# Patient Record
Sex: FEMALE | Race: WHITE | NOT HISPANIC OR LATINO | URBAN - METROPOLITAN AREA
[De-identification: names, ages, dates, MRNs, and addresses within clinical notes are randomized per-mention and may not be internally consistent; named-entity substitution may affect disease eponyms.]

---

## 2017-11-01 ENCOUNTER — EMERGENCY (EMERGENCY)
Facility: HOSPITAL | Age: 57
LOS: 1 days | Discharge: HOME | End: 2017-11-01
Attending: EMERGENCY MEDICINE | Admitting: EMERGENCY MEDICINE
Payer: MEDICARE

## 2017-11-01 VITALS
TEMPERATURE: 97 F | DIASTOLIC BLOOD PRESSURE: 65 MMHG | SYSTOLIC BLOOD PRESSURE: 120 MMHG | OXYGEN SATURATION: 100 % | HEART RATE: 62 BPM | RESPIRATION RATE: 18 BRPM

## 2017-11-01 VITALS
RESPIRATION RATE: 18 BRPM | OXYGEN SATURATION: 100 % | HEART RATE: 64 BPM | DIASTOLIC BLOOD PRESSURE: 63 MMHG | WEIGHT: 110.89 LBS | SYSTOLIC BLOOD PRESSURE: 98 MMHG | HEIGHT: 64 IN | TEMPERATURE: 98 F

## 2017-11-01 LAB
APTT BLD: 28.6 SEC — SIGNIFICANT CHANGE UP (ref 27.5–37.4)
CK SERPL-CCNC: 71 U/L — SIGNIFICANT CHANGE UP (ref 25–170)
HCT VFR BLD CALC: 37.4 % — SIGNIFICANT CHANGE UP (ref 37–47)
HGB BLD-MCNC: 12.7 G/DL — SIGNIFICANT CHANGE UP (ref 12–16)
INR BLD: 0.95 RATIO — SIGNIFICANT CHANGE UP (ref 0.88–1.16)
MCHC RBC-ENTMCNC: 31 PG — SIGNIFICANT CHANGE UP (ref 27–31)
MCHC RBC-ENTMCNC: 34 G/DL — SIGNIFICANT CHANGE UP (ref 32–36)
MCV RBC AUTO: 91.2 FL — SIGNIFICANT CHANGE UP (ref 81–99)
PLATELET # BLD AUTO: 276 K/UL — SIGNIFICANT CHANGE UP (ref 150–400)
PROTHROM AB SERPL-ACNC: 10.4 SEC — SIGNIFICANT CHANGE UP (ref 9.8–12.7)
RBC # BLD: 4.1 M/UL — LOW (ref 4.4–5.2)
RBC # FLD: 13.2 % — SIGNIFICANT CHANGE UP (ref 11–15.6)
T3 SERPL-MCNC: 101 NG/DL — SIGNIFICANT CHANGE UP (ref 80–200)
TROPONIN T SERPL-MCNC: <0.01 NG/ML — SIGNIFICANT CHANGE UP (ref 0–0.06)
TROPONIN T SERPL-MCNC: <0.01 NG/ML — SIGNIFICANT CHANGE UP (ref 0–0.06)
WBC # BLD: 6.6 K/UL — SIGNIFICANT CHANGE UP (ref 4.8–10.8)
WBC # FLD AUTO: 6.6 K/UL — SIGNIFICANT CHANGE UP (ref 4.8–10.8)

## 2017-11-01 PROCEDURE — 84484 ASSAY OF TROPONIN QUANT: CPT

## 2017-11-01 PROCEDURE — 85610 PROTHROMBIN TIME: CPT

## 2017-11-01 PROCEDURE — 84436 ASSAY OF TOTAL THYROXINE: CPT

## 2017-11-01 PROCEDURE — 80053 COMPREHEN METABOLIC PANEL: CPT

## 2017-11-01 PROCEDURE — 93005 ELECTROCARDIOGRAM TRACING: CPT

## 2017-11-01 PROCEDURE — 36415 COLL VENOUS BLD VENIPUNCTURE: CPT

## 2017-11-01 PROCEDURE — 82550 ASSAY OF CK (CPK): CPT

## 2017-11-01 PROCEDURE — 84443 ASSAY THYROID STIM HORMONE: CPT

## 2017-11-01 PROCEDURE — 99283 EMERGENCY DEPT VISIT LOW MDM: CPT | Mod: 25

## 2017-11-01 PROCEDURE — 84480 ASSAY TRIIODOTHYRONINE (T3): CPT

## 2017-11-01 PROCEDURE — 85730 THROMBOPLASTIN TIME PARTIAL: CPT

## 2017-11-01 PROCEDURE — 83735 ASSAY OF MAGNESIUM: CPT

## 2017-11-01 PROCEDURE — 84100 ASSAY OF PHOSPHORUS: CPT

## 2017-11-01 PROCEDURE — 99285 EMERGENCY DEPT VISIT HI MDM: CPT

## 2017-11-01 PROCEDURE — 93010 ELECTROCARDIOGRAM REPORT: CPT

## 2017-11-01 PROCEDURE — 85027 COMPLETE CBC AUTOMATED: CPT

## 2017-11-01 RX ORDER — IBUPROFEN 200 MG
600 TABLET ORAL ONCE
Qty: 0 | Refills: 0 | Status: COMPLETED | OUTPATIENT
Start: 2017-11-01 | End: 2017-11-01

## 2017-11-01 RX ORDER — POTASSIUM CHLORIDE 20 MEQ
40 PACKET (EA) ORAL ONCE
Qty: 0 | Refills: 0 | Status: COMPLETED | OUTPATIENT
Start: 2017-11-01 | End: 2017-11-01

## 2017-11-01 RX ORDER — SODIUM CHLORIDE 9 MG/ML
3 INJECTION INTRAMUSCULAR; INTRAVENOUS; SUBCUTANEOUS ONCE
Qty: 0 | Refills: 0 | Status: COMPLETED | OUTPATIENT
Start: 2017-11-01 | End: 2017-11-01

## 2017-11-01 RX ADMIN — Medication 600 MILLIGRAM(S): at 16:46

## 2017-11-01 RX ADMIN — Medication 40 MILLIEQUIVALENT(S): at 15:59

## 2017-11-01 RX ADMIN — Medication 600 MILLIGRAM(S): at 15:59

## 2017-11-01 RX ADMIN — SODIUM CHLORIDE 3 MILLILITER(S): 9 INJECTION INTRAMUSCULAR; INTRAVENOUS; SUBCUTANEOUS at 13:40

## 2017-11-01 NOTE — ED ADULT TRIAGE NOTE - CHIEF COMPLAINT QUOTE
pt reports she has been having on and off numbness to extremities for a couple weeks and right side of face, slurring to speech noted, sent in from PMD office today as she was experiencing chest heaviness.

## 2017-11-01 NOTE — ED PROVIDER NOTE - MEDICAL DECISION MAKING DETAILS
PT WITH CHESTP AIN BACK PAIN BUE NUMBNESS, FATIGUE. PERSISTENT ABNORMAL EKG WITHOUT NEW CHANGES WORK  UP ORDERED. RESULTS PENDING. DR LOMBARDI, [facility]

## 2017-11-01 NOTE — ED ADULT NURSE REASSESSMENT NOTE - NS ED NURSE REASSESS COMMENT FT1
pt aware she is waiting for second troponin to be drawn at 1700, verbalized understanding. Pt in no apparent distress, resting comfortably in bed.

## 2017-11-01 NOTE — ED PROVIDER NOTE - OBJECTIVE STATEMENT
PATIENT STATES SHE HAS HAD A "WEIGHT ON HER CHEST" TODAY. SHE ALSO FEELS UPPER BACK DISCOMFORT. SHE ALSO ADMITS TO NUMBNESS FROM THE SHOULDERS TO THE ELBOWS BILATERALLY. SHE ALSO ADMITS TO TINGLING ON THE LEFT SIDE OF HER BODY ON AND OFF FOR THE PAST 2 WEEKS PATIENT STATES SHE HAS HAD A "WEIGHT ON HER CHEST" TODAY. SHE ALSO FEELS UPPER BACK DISCOMFORT. SHE ALSO ADMITS TO NUMBNESS FROM THE SHOULDERS TO THE ELBOWS BILATERALLY. SHE ALSO ADMITS TO TINGLING ON THE LEFT SIDE OF HER BODY ON AND OFF FOR THE PAST 2 WEEKS. pT ALSO ADMITS TO FEELING "TIRED"  SHE HAS A HX OF MYASTHENIA GRAVIS. NON SMOKER. NO OTHER ASSOCIATED SYMPTOMS NEG FHX CAD  MED HXMyasthenia gravis    Trigeminal nerve disease  Left side jaw  THYROID DISEASE

## 2017-11-01 NOTE — ED ADULT NURSE NOTE - OBJECTIVE STATEMENT
Pt has had chest pain radiating to her shoulder blades for the last week on and off. Pt states she does not have shortness of breath but hurts when she takes deep breaths. Denies n/v.

## 2017-11-01 NOTE — ED PROVIDER NOTE - PROGRESS NOTE DETAILS
DISCUSSED WITH DR SCHULTZ. IF TWO NEG ENZ AND NO EKG CHANGES SEND TO OFFICE TOMORROW 10:30 AM TO SEE DR SHAIKH. ARABELLA LSTRESS TEST 4 MONTHS AGO

## 2018-02-05 ENCOUNTER — TRANSCRIPTION ENCOUNTER (OUTPATIENT)
Age: 58
End: 2018-02-05

## 2018-09-25 ENCOUNTER — TRANSCRIPTION ENCOUNTER (OUTPATIENT)
Age: 58
End: 2018-09-25

## 2018-12-24 ENCOUNTER — TRANSCRIPTION ENCOUNTER (OUTPATIENT)
Age: 58
End: 2018-12-24

## 2019-02-07 PROBLEM — G70.00 MYASTHENIA GRAVIS WITHOUT (ACUTE) EXACERBATION: Chronic | Status: ACTIVE | Noted: 2017-11-01

## 2019-02-07 PROBLEM — G50.9 DISORDER OF TRIGEMINAL NERVE, UNSPECIFIED: Chronic | Status: ACTIVE | Noted: 2017-11-01

## 2019-03-12 ENCOUNTER — APPOINTMENT (OUTPATIENT)
Dept: ORTHOPEDIC SURGERY | Facility: CLINIC | Age: 59
End: 2019-03-12
Payer: MEDICARE

## 2019-03-12 VITALS
WEIGHT: 120 LBS | HEIGHT: 64 IN | BODY MASS INDEX: 20.49 KG/M2 | HEART RATE: 77 BPM | DIASTOLIC BLOOD PRESSURE: 78 MMHG | SYSTOLIC BLOOD PRESSURE: 112 MMHG

## 2019-03-12 DIAGNOSIS — Z87.39 PERSONAL HISTORY OF OTHER DISEASES OF THE MUSCULOSKELETAL SYSTEM AND CONNECTIVE TISSUE: ICD-10-CM

## 2019-03-12 DIAGNOSIS — Z78.9 OTHER SPECIFIED HEALTH STATUS: ICD-10-CM

## 2019-03-12 DIAGNOSIS — M17.10 UNILATERAL PRIMARY OSTEOARTHRITIS, UNSPECIFIED KNEE: ICD-10-CM

## 2019-03-12 PROCEDURE — 73030 X-RAY EXAM OF SHOULDER: CPT | Mod: RT

## 2019-03-12 PROCEDURE — 99214 OFFICE O/P EST MOD 30 MIN: CPT

## 2019-03-25 NOTE — HISTORY OF PRESENT ILLNESS
[de-identified] : Ms. FRIAS is a 58 year old female who presents with Right shoulder pain. No injury.  Started with crocheting which made it worse.  Stopped crocheting but still hurts.  Moving it a ot hurts.  NSAIDs help a little.  Just takes prn.  Has a sensitive stomach.  \par \par Hand Dominance: RHD\par Location: Right shoulder \par Timin months \par Quality:  throbbing\par Severity: 3/10\par Treatments Tried: ibuprofen or motrin\par Associated Signs and Symptoms: burning, numbness, worst after activities \par What makes it worse: movement\par What makes it better: rest \par Improving/Worsening/Unchanged: worsening \par PT hx: no Pt\par

## 2019-03-25 NOTE — REVIEW OF SYSTEMS
[Joint Pain] : joint pain [Feeling Tired] : fatigue [Headache] : headache [Dizziness] : dizziness [Anxiety] : anxiety [Feeling Weak] : feeling weak [Easy Bruising] : a tendency for easy bruising [Negative] : Integumentary [FreeTextEntry9] : Right shoulder

## 2019-03-25 NOTE — PHYSICAL EXAM
[de-identified] : Patient is well appearing, in non acute distress with nonlabored breathing and appropriate mood and affect. Extra-ocular movements intact and no nasal discharge.\par \par Right UE warm and well perfused; palpable radial pulse\par SILT C5-T1\par motor intact to finger flexion and extension, wrist flexion and extension, elbow flexion and extension, supination and pronation, deltoid\par \par Left UE warm and well perfused; palpable radial pulse\par SILT C5-T1\par motor intact to finger flexion and extension, wrist flexion and extension, elbow flexion and extension, supination and pronation, deltoid\par \par \par Right Shoulder \par \par Appearance\par negative atrophy of musculature\par negative winging\par \par Tenderness to Palpation about the shoulder is as at the anterolateral greater tuberosity\par \par Range of Motion: Active / Passive\par Forward Elevation: full \par Abduction: full \par ER at 0 degrees of abduction: full \par ER at 90 degrees of abduction: full \par Internal Rotation full\par \par Motor Strength\par Supraspinatus: 5 /5\par Infraspinatus: 5 /5\par Subscapularis: 5 /5\par Teres Minor: 5/ 5\par \par Provocative Maneuvers\par negative Drop Arm\par negative Belly Press\par negative Hornblowers\par Positive Forrest\par negative Neer\par negative Speeds\par \par Left Shoulder \par \par Appearance\par negative atrophy of musculature\par negative winging\par \par Tenderness to Palpation about the shoulder is negative\par \par Range of Motion: Active / Passive\par Forward Elevation: full \par Abduction: full \par ER at 0 degrees of abduction: full \par ER at 90 degrees of abduction: full \par Internal Rotation full\par \par Motor Strength\par Supraspinatus: 5 /5\par Infraspinatus: 5 /5\par Subscapularis: 5 /5\par Teres Minor: 5/ 5\par \par Provocative Maneuvers\par negative Drop Arm\par negative Belly Press\par negative Hornblowers\par negative Forrest\par negative Neer\par negative Speeds [de-identified] : Right Shoulder Xray was visualized and reviewed by me\par well preserved glenohumeral joint space and significantly decreased acromioclavicular joint space with degenerative changes and cystic changes, particularly at distal clavicle and with osteophytosis\par no fracture or dislocation\par

## 2019-03-25 NOTE — ASSESSMENT
[FreeTextEntry1] : Patient is a 58-year-old female with signs and symptoms consistent with right rotator cuff tendinitis.Discussed findings and diagnosis and the risks, benefits, and alternatives of all treatment options. The patient opted for a course of meloxicam after risks were discussed. I also prescribed physical therapy. Patient will followup in 6 weeks after therapy. Patient may return to clinic sooner if her pain worsens or persists.

## 2019-03-25 NOTE — CONSULT LETTER
[Dear  ___] : Dear  [unfilled], [Consult Letter:] : I had the pleasure of evaluating your patient, [unfilled]. [( Thank you for referring [unfilled] for consultation for _____ )] : Thank you for referring [unfilled] for consultation for [unfilled] [Please see my note below.] : Please see my note below. [Consult Closing:] : Thank you very much for allowing me to participate in the care of this patient.  If you have any questions, please do not hesitate to contact me. [Sincerely,] : Sincerely, [FreeTextEntry2] : Dr. Adrian Lombardi\par [phone] [FreeTextEntry3] : Beverly Gregg MD\par [facility]\par [address]\par [place] [id number]\par Tel [phone]\par

## 2019-04-22 ENCOUNTER — APPOINTMENT (OUTPATIENT)
Dept: ORTHOPEDIC SURGERY | Facility: CLINIC | Age: 59
End: 2019-04-22
Payer: MEDICARE

## 2019-04-22 VITALS
BODY MASS INDEX: 20.49 KG/M2 | DIASTOLIC BLOOD PRESSURE: 77 MMHG | HEIGHT: 64 IN | HEART RATE: 65 BPM | WEIGHT: 120 LBS | TEMPERATURE: 98.4 F | SYSTOLIC BLOOD PRESSURE: 119 MMHG

## 2019-04-22 DIAGNOSIS — M17.11 UNILATERAL PRIMARY OSTEOARTHRITIS, RIGHT KNEE: ICD-10-CM

## 2019-04-22 PROCEDURE — 20610 DRAIN/INJ JOINT/BURSA W/O US: CPT | Mod: LT

## 2019-04-22 PROCEDURE — 73562 X-RAY EXAM OF KNEE 3: CPT | Mod: 50

## 2019-04-22 PROCEDURE — 99214 OFFICE O/P EST MOD 30 MIN: CPT | Mod: 25

## 2019-04-22 NOTE — PROCEDURE
[de-identified] : I injected the patient's left knee today with cortisone.\par \par I discussed at length with the patient the planned steroid and lidocaine injection. The risks, benefits, convalescence and alternatives were reviewed. The possible side effects discussed included but were not limited to: pain, swelling, heat, bleeding, and redness. Symptoms are generally mild but if they are extensive then contact the office. Giving pain relievers by mouth such as NSAIDs or Tylenol can generally treat the reactions to steroid and lidocaine. Rare cases of infection have been noted. Rash, hives and itching may occur post injection. If you have muscle pain or cramps, flushing and or swelling of the face, rapid heart beat, nausea, dizziness, fever, chills, headache, difficulty breathing, swelling in the arms or legs, or have a prickly feeling of your skin, contact a health care provider immediately. Following this discussion, the knee was prepped with Alcohol and under sterile condition the 80 mg Depo-Medrol and 6 cc Lidocaine injection was performed with a 20 gauge needle through a superolateral injection site. The needle was introduced into the joint, aspiration was performed to ensure intra-articular placement and the medication was injected. Upon withdrawal of the needle the site was cleaned with alcohol and a band aid applied. The patient tolerated the injection well and there were no adverse effects. Post injection instructions included no strenuous activity for 24 hours, cryotherapy and if there are any adverse effects to contact the office. \par \par \par

## 2019-04-22 NOTE — PHYSICAL EXAM
[Normal] : Gait: normal [LE] : Sensory: Intact in bilateral lower extremities [ALL] : dorsalis pedis, posterior tibial, femoral, popliteal, and radial 2+ and symmetric bilaterally [Antalgic] : not antalgic [Poor Appearance] : well-appearing [Acute Distress] : not in acute distress [Obese] : not obese [de-identified] : GENERAL APPEARANCE: Well nourished and hydrated, pleasant, alert, and oriented x 3. Appears their stated age. \par HEENT: Normocephalic, extraocular eye motion intact. Nasal septum midline. Oral cavity clear. External auditory canal clear. \par RESPIRATORY: Breath sounds clear and audible in all lobes. No wheezing, No accessory muscle use.\par CARDIOVASCULAR: No apparent abnormalities. No lower leg edema. No varicosities. Pedal pulses are palpable.\par NEUROLOGIC: Sensation is normal, no muscle weakness in the upper or lower extremities.\par DERMATOLOGIC: No apparent skin lesions, moist, warm, no rash.\par SPINE: Cervical spine appears normal and moves freely; thoracic spine appears normal and moves freely; lumbosacral spine appears normal and moves freely, normal, nontender.\par MUSCULOSKELETAL: Hands, wrists, and elbows are normal and move freely, shoulders are normal and move freely.  [de-identified] : Right hip exam shows able to SLR, no pain with Stinchfield maneuver, slight loss of internal and external rotation.\par Left hip exam shows able to SLR, pain with Stinchfield maneuver, slight loss of internal and external rotation, more extensive than right hip.\par Bilateral knee exam shows crepitus with ROM, medial jointline tenderness, no instability. [de-identified] : 5V Xray of the bilateral knees done in office today and reviewed by Dr. Gregorio Brito demonstrates calcification of medial and lateral menisci bilaterally, mild tricompartmental osteoarthritis of the bilateral knees.

## 2019-04-22 NOTE — DISCUSSION/SUMMARY
[de-identified] : 58 year female presents with mild tricompartmental osteoarthritis of the bilateral knees, calcification of medial and lateral menisci bilaterally. We discussed the nature of the condition and the treatment options.\par \par Based on imaging and her well controlled symptoms I recommended she continue with conservative treatment. We discussed treatment with physical therapy and cortisone injections\par \par She elected to receive a cortisone injection in her left knee which she tolerated well. I provided her with a prescription for physical therapy.\par \par She can follow-up as needed.\par \par She is a potential candidate for left knee arthroscopy based on her prior MRI.\par \par \par \par

## 2019-04-22 NOTE — ADDENDUM
[FreeTextEntry1] : I, Luis Alfredo Burk, acted solely as a scribe for Dr. Gregorio Brito on this date 04/22/2019.

## 2019-04-22 NOTE — HISTORY OF PRESENT ILLNESS
[Stable] : stable [___ yrs] : [unfilled] year(s) ago [3] : a current pain level of 3/10 [Walking] : walking [Standing] : standing [Intermit.] : ~He/She~ states the symptoms seem to be intermittent [Bending] : worsened by bending [NSAIDs] : relieved by nonsteroidal anti-inflammatory drugs [Recumbency] : relieved by recumbency [Rest] : relieved by rest [de-identified] : cortisone injection [de-identified] : 58 year old female presents for evaluation of bilateral knee pain, left worse than right. She notes that navigating stairs and activity in general exacerbates her pain. Ascending stairs also results in crepitus. She reports a recent injury when she stepped on a skateboard and fell, resulting in a worsening in symptoms. \par She was last seen in our office 2/24/2016. She received a cortisone injection with good relief. She has also taken Meloxicam with a good response.

## 2019-05-07 ENCOUNTER — OUTPATIENT (OUTPATIENT)
Dept: OUTPATIENT SERVICES | Facility: HOSPITAL | Age: 59
LOS: 1 days | End: 2019-05-07
Payer: MEDICARE

## 2019-05-07 DIAGNOSIS — Z51.89 ENCOUNTER FOR OTHER SPECIFIED AFTERCARE: ICD-10-CM

## 2019-05-07 DIAGNOSIS — M17.0 BILATERAL PRIMARY OSTEOARTHRITIS OF KNEE: ICD-10-CM

## 2019-05-07 DIAGNOSIS — M75.81 OTHER SHOULDER LESIONS, RIGHT SHOULDER: ICD-10-CM

## 2019-06-06 ENCOUNTER — TRANSCRIPTION ENCOUNTER (OUTPATIENT)
Age: 59
End: 2019-06-06

## 2019-06-11 ENCOUNTER — APPOINTMENT (OUTPATIENT)
Dept: ORTHOPEDIC SURGERY | Facility: CLINIC | Age: 59
End: 2019-06-11
Payer: MEDICARE

## 2019-06-11 VITALS
HEIGHT: 64 IN | HEART RATE: 76 BPM | SYSTOLIC BLOOD PRESSURE: 103 MMHG | WEIGHT: 120 LBS | BODY MASS INDEX: 20.49 KG/M2 | DIASTOLIC BLOOD PRESSURE: 72 MMHG

## 2019-06-11 PROCEDURE — 99214 OFFICE O/P EST MOD 30 MIN: CPT

## 2019-06-11 RX ORDER — PYRIDOSTIGMINE BROMIDE 60 MG/1
60 TABLET ORAL
Refills: 0 | Status: ACTIVE | COMMUNITY

## 2019-06-14 ENCOUNTER — APPOINTMENT (OUTPATIENT)
Dept: RHEUMATOLOGY | Facility: CLINIC | Age: 59
End: 2019-06-14
Payer: MEDICARE

## 2019-06-14 ENCOUNTER — TRANSCRIPTION ENCOUNTER (OUTPATIENT)
Age: 59
End: 2019-06-14

## 2019-06-14 VITALS
SYSTOLIC BLOOD PRESSURE: 110 MMHG | BODY MASS INDEX: 20.49 KG/M2 | HEART RATE: 67 BPM | DIASTOLIC BLOOD PRESSURE: 70 MMHG | WEIGHT: 120 LBS | HEIGHT: 64 IN

## 2019-06-14 DIAGNOSIS — Z86.69 PERSONAL HISTORY OF OTHER DISEASES OF THE NERVOUS SYSTEM AND SENSE ORGANS: ICD-10-CM

## 2019-06-14 PROCEDURE — 99205 OFFICE O/P NEW HI 60 MIN: CPT | Mod: 25

## 2019-06-14 PROCEDURE — 36415 COLL VENOUS BLD VENIPUNCTURE: CPT

## 2019-06-14 NOTE — CONSULT LETTER
[Dear  ___] : Dear  [unfilled], [Consult Letter:] : I had the pleasure of evaluating your patient, [unfilled]. [Please see my note below.] : Please see my note below. [Consult Closing:] : Thank you very much for allowing me to participate in the care of this patient.  If you have any questions, please do not hesitate to contact me. [Sincerely,] : Sincerely, [FreeTextEntry3] : Travis Oviedo MD\par Rheumatology\par [facility]\par  of Medicine\par [employer] [facility] \par \par [address]\[place] [address]\par phone:  [phone]\par fax:      [phone]\par \par [address] [place] [address]\[place] [address]\par phone:  [phone]\par fax:      [phone]\par

## 2019-06-14 NOTE — HISTORY OF PRESENT ILLNESS
[FreeTextEntry1] : 58 year old female with PMHx as listed below reports that she has been experiencing intermittent pain in her right wrist for the past 5-6 years.  The pain was typically worse with activity or with banging it.  About 2 weeks ago, she began to experience more severe pain in the wrist w/ mild swelling, warmth and erythema.  This recent pain has been constant, though now has begun to improve .  She describes the pain as burning and throbbing, previously 4-5 out of 10, though up to 8 out of 10 over the past 2 weeks.  (+) AM stiffness, usually lasting 15-20 minutes.  She tried taking Advil 400mg but it didn't help.  No known alleviating factors.\par Pt also c/o pain in her B/L hips and knees, primarily with walking and climbing stairs.  No swelling/erythema/warmth.  Also w/ right shoulder pain, worst with activity.\par No F/C, no unintentional weight loss, no night sweats, no oral ulcers, no rashes, no alopecia, no photosensitivity, no dry eyes/dry mouth, no Raynaud symptoms, no focal weakness, no dysphagia\par  [Anorexia] : no anorexia [Weight Loss] : no weight loss [Malaise] : no malaise [Fever] : no fever [Chills] : no chills [Fatigue] : no fatigue [Malar Facial Rash] : no malar facial rash [Skin Lesions] : no lesions [Skin Nodules] : no skin nodules [Oral Ulcers] : no oral ulcers [Cough] : no cough

## 2019-06-14 NOTE — PHYSICAL EXAM
[General Appearance - Alert] : alert [General Appearance - In No Acute Distress] : in no acute distress [Sclera] : the sclera and conjunctiva were normal [Outer Ear] : the ears and nose were normal in appearance [Oropharynx] : the oropharynx was normal [Neck Appearance] : the appearance of the neck was normal [Thyroid Nodule] : there were no palpable thyroid nodules [Thyroid Diffuse Enlargement] : the thyroid was not enlarged [Neck Cervical Mass (___cm)] : no neck mass was observed [Jugular Venous Distention Increased] : there was no jugular-venous distention [Heart Rate And Rhythm] : heart rate was normal and rhythm regular [Auscultation Breath Sounds / Voice Sounds] : lungs were clear to auscultation bilaterally [Heart Sounds Gallop] : no gallops [Heart Sounds] : normal S1 and S2 [Heart Sounds Pericardial Friction Rub] : no pericardial rub [Murmurs] : no murmurs [Edema] : there was no peripheral edema [Abdomen Soft] : soft [Bowel Sounds] : normal bowel sounds [Abdomen Mass (___ Cm)] : no abdominal mass palpated [Abdomen Tenderness] : non-tender [Cervical Lymph Nodes Enlarged Posterior Bilaterally] : posterior cervical [Cervical Lymph Nodes Enlarged Anterior Bilaterally] : anterior cervical [Supraclavicular Lymph Nodes Enlarged Bilaterally] : supraclavicular [No Spinal Tenderness] : no spinal tenderness [Skin Turgor] : normal skin turgor [Skin Color & Pigmentation] : normal skin color and pigmentation [] : no rash [No Focal Deficits] : no focal deficits [Oriented To Time, Place, And Person] : oriented to person, place, and time [Impaired Insight] : insight and judgment were intact [Affect] : the affect was normal [FreeTextEntry1] : No synovitis;  right wrist w/ mild tenderness;  right shoulder w/pain upon abduction, internal rotation, (+)impingement;  B/L hips w/ pain upon internal rotation and abduction;  B/L knees w/ mild pain upon flexion

## 2019-06-14 NOTE — ASSESSMENT
[FreeTextEntry1] : 58 year old female with polyarticular joint pains which appear to be multifactorial:\par 1)  Right wrist w/ acute on chronic pain:  chronic pain c/w OA, while acute symptoms most c/w inflammatory arthritis - most likely CPPD/pseudogout, given chondrocalcinosis on x-rays though would need arthrocentesis for definitive diagnosis (no effusion to aspirate at this time).  \par   - As this is her first flare, preventive treatment not indicated at this time.\par   - Check labs - r/o underlying cause for CPPD.\par   - Treatment for OA as below.\par 2)  Pain in B/L hips and B/L knees most suggestive of OA\par   - x-rays of hands, hips, and knees\par   - Reiterated importance of exercise\par   - ibuprofen and/or Tylenol prn\par   - warm compresses\par   - OTC topical analgesics\par 3)  Left shoulder pain:  most c/w RTC tendonopathy\par   - shoulder exercises\par   - analgesia as above\par 4)  Pt also w/ reported hx of osteoporosis, though has been off all treatment for several years.\par   - Order DEXA.

## 2019-06-17 ENCOUNTER — CLINICAL ADVICE (OUTPATIENT)
Age: 59
End: 2019-06-17

## 2019-06-17 LAB
25(OH)D3 SERPL-MCNC: 18.4 NG/ML
ALBUMIN SERPL ELPH-MCNC: 4.2 G/DL
ALP BLD-CCNC: 130 U/L
ALT SERPL-CCNC: 16 U/L
ANION GAP SERPL CALC-SCNC: 19 MMOL/L
AST SERPL-CCNC: 24 U/L
BASOPHILS # BLD AUTO: 0.06 K/UL
BASOPHILS NFR BLD AUTO: 0.9 %
BILIRUB SERPL-MCNC: 0.2 MG/DL
BUN SERPL-MCNC: 11 MG/DL
CALCIUM SERPL-MCNC: 9.5 MG/DL
CALCIUM SERPL-MCNC: 9.5 MG/DL
CHLORIDE SERPL-SCNC: 98 MMOL/L
CO2 SERPL-SCNC: 22 MMOL/L
CREAT SERPL-MCNC: 0.96 MG/DL
CRP SERPL-MCNC: <0.1 MG/DL
EOSINOPHIL # BLD AUTO: 0.08 K/UL
EOSINOPHIL NFR BLD AUTO: 1.2 %
ERYTHROCYTE [SEDIMENTATION RATE] IN BLOOD BY WESTERGREN METHOD: 7 MM/HR
FERRITIN SERPL-MCNC: 337 NG/ML
GLUCOSE SERPL-MCNC: 105 MG/DL
HCT VFR BLD CALC: 42 %
HGB BLD-MCNC: 14 G/DL
IMM GRANULOCYTES NFR BLD AUTO: 0.3 %
IRON SATN MFR SERPL: 44 %
IRON SERPL-MCNC: 96 UG/DL
LYMPHOCYTES # BLD AUTO: 2.69 K/UL
LYMPHOCYTES NFR BLD AUTO: 39.2 %
MAGNESIUM SERPL-MCNC: 1.9 MG/DL
MAN DIFF?: NORMAL
MCHC RBC-ENTMCNC: 30.2 PG
MCHC RBC-ENTMCNC: 33.3 GM/DL
MCV RBC AUTO: 90.5 FL
MONOCYTES # BLD AUTO: 0.53 K/UL
MONOCYTES NFR BLD AUTO: 7.7 %
NEUTROPHILS # BLD AUTO: 3.49 K/UL
NEUTROPHILS NFR BLD AUTO: 50.7 %
PARATHYROID HORMONE INTACT: 61 PG/ML
PHOSPHATE SERPL-MCNC: 3.4 MG/DL
PLATELET # BLD AUTO: 360 K/UL
POTASSIUM SERPL-SCNC: 3 MMOL/L
PROT SERPL-MCNC: 6.3 G/DL
RBC # BLD: 4.64 M/UL
RBC # FLD: 14.1 %
SODIUM SERPL-SCNC: 139 MMOL/L
TIBC SERPL-MCNC: 218 UG/DL
TRANSFERRIN SERPL-MCNC: 181 MG/DL
UIBC SERPL-MCNC: 122 UG/DL
WBC # FLD AUTO: 6.87 K/UL

## 2019-06-17 RX ORDER — ERGOCALCIFEROL 1.25 MG/1
1.25 MG CAPSULE, LIQUID FILLED ORAL
Qty: 12 | Refills: 0 | Status: ACTIVE | COMMUNITY
Start: 2019-06-17 | End: 1900-01-01

## 2019-06-19 ENCOUNTER — FORM ENCOUNTER (OUTPATIENT)
Age: 59
End: 2019-06-19

## 2019-06-20 ENCOUNTER — APPOINTMENT (OUTPATIENT)
Dept: RADIOLOGY | Facility: CLINIC | Age: 59
End: 2019-06-20
Payer: MEDICARE

## 2019-06-20 ENCOUNTER — OUTPATIENT (OUTPATIENT)
Dept: OUTPATIENT SERVICES | Facility: HOSPITAL | Age: 59
LOS: 1 days | End: 2019-06-20
Payer: MEDICARE

## 2019-06-20 DIAGNOSIS — Z00.00 ENCOUNTER FOR GENERAL ADULT MEDICAL EXAMINATION WITHOUT ABNORMAL FINDINGS: ICD-10-CM

## 2019-06-20 PROCEDURE — 73562 X-RAY EXAM OF KNEE 3: CPT | Mod: 26,50

## 2019-06-20 PROCEDURE — 73130 X-RAY EXAM OF HAND: CPT | Mod: 26,50

## 2019-06-20 PROCEDURE — 77080 DXA BONE DENSITY AXIAL: CPT | Mod: 26

## 2019-06-20 PROCEDURE — 73522 X-RAY EXAM HIPS BI 3-4 VIEWS: CPT | Mod: 26

## 2019-06-20 PROCEDURE — 73562 X-RAY EXAM OF KNEE 3: CPT

## 2019-06-20 PROCEDURE — 77080 DXA BONE DENSITY AXIAL: CPT

## 2019-06-20 PROCEDURE — 73522 X-RAY EXAM HIPS BI 3-4 VIEWS: CPT

## 2019-06-20 PROCEDURE — 73130 X-RAY EXAM OF HAND: CPT

## 2019-06-26 PROCEDURE — 97110 THERAPEUTIC EXERCISES: CPT

## 2019-06-26 PROCEDURE — 97140 MANUAL THERAPY 1/> REGIONS: CPT

## 2019-06-26 PROCEDURE — 97163 PT EVAL HIGH COMPLEX 45 MIN: CPT

## 2019-06-26 PROCEDURE — 97166 OT EVAL MOD COMPLEX 45 MIN: CPT

## 2019-06-26 PROCEDURE — 97010 HOT OR COLD PACKS THERAPY: CPT

## 2019-07-26 ENCOUNTER — APPOINTMENT (OUTPATIENT)
Dept: RHEUMATOLOGY | Facility: CLINIC | Age: 59
End: 2019-07-26
Payer: MEDICARE

## 2019-07-26 VITALS
WEIGHT: 115 LBS | BODY MASS INDEX: 19.63 KG/M2 | HEART RATE: 62 BPM | SYSTOLIC BLOOD PRESSURE: 92 MMHG | DIASTOLIC BLOOD PRESSURE: 68 MMHG | HEIGHT: 64 IN | OXYGEN SATURATION: 95 %

## 2019-07-26 PROCEDURE — 99215 OFFICE O/P EST HI 40 MIN: CPT | Mod: 25

## 2019-07-26 PROCEDURE — 36415 COLL VENOUS BLD VENIPUNCTURE: CPT

## 2019-07-26 NOTE — ASSESSMENT
[FreeTextEntry1] : 58 year old female with polyarticular joint pains which appear to be multifactorial:\par 1)  Right wrist s/p episode of acute on chronic pain:  chronic pain c/w OA, while acute symptoms most c/w inflammatory arthritis - most likely CPPD/pseudogout, given chondrocalcinosis on x-rays though would need arthrocentesis for definitive diagnosis (no effusion to aspirate at this time).  w/melendez reveals mildly elevated ferritin though normal iron level.\par   - As this was her first flare, preventive treatment not indicated at this time.\par   - Treatment for OA as below.\par   - Repeat ferritin / iron studies.\par 2)  Pain in B/L hips - due to OA;  also w/ B/L knee pain - x-rays unremarkable though pt reports hx of torn meniscus in left knee\par   - Reiterated importance of exercise\par   - ibuprofen and/or Tylenol prn\par   - warm compresses\par   - joint protection techniques\par   - OTC topical analgesics\par 3)  Right shoulder pain:  most c/w RTC tendinopathy - improving\par   - Cont shoulder exercises\par   - analgesia as above\par 4)  Osteoporosis w/ hx of multiple fractures.  Previously didn't tolerate Fosamax and failed Boniva IV.\par   - Start Forteo.\par 5)  Hypokalemia:  pt has increased intake of high-potassium foods\par   - Repeat BMP.\par 6)  Vit D deficiency\par   - Cont vit D 50,000IU weekly.

## 2019-07-26 NOTE — PHYSICAL EXAM
[General Appearance - Alert] : alert [General Appearance - In No Acute Distress] : in no acute distress [Sclera] : the sclera and conjunctiva were normal [Oropharynx] : the oropharynx was normal [Neck Appearance] : the appearance of the neck was normal [Outer Ear] : the ears and nose were normal in appearance [Neck Cervical Mass (___cm)] : no neck mass was observed [Thyroid Diffuse Enlargement] : the thyroid was not enlarged [Thyroid Nodule] : there were no palpable thyroid nodules [Jugular Venous Distention Increased] : there was no jugular-venous distention [Auscultation Breath Sounds / Voice Sounds] : lungs were clear to auscultation bilaterally [Heart Rate And Rhythm] : heart rate was normal and rhythm regular [Heart Sounds Gallop] : no gallops [Heart Sounds] : normal S1 and S2 [Edema] : there was no peripheral edema [Heart Sounds Pericardial Friction Rub] : no pericardial rub [Murmurs] : no murmurs [Bowel Sounds] : normal bowel sounds [Abdomen Soft] : soft [Abdomen Mass (___ Cm)] : no abdominal mass palpated [Abdomen Tenderness] : non-tender [Cervical Lymph Nodes Enlarged Anterior Bilaterally] : anterior cervical [Cervical Lymph Nodes Enlarged Posterior Bilaterally] : posterior cervical [Supraclavicular Lymph Nodes Enlarged Bilaterally] : supraclavicular [No Spinal Tenderness] : no spinal tenderness [Skin Turgor] : normal skin turgor [] : no rash [Skin Color & Pigmentation] : normal skin color and pigmentation [Oriented To Time, Place, And Person] : oriented to person, place, and time [No Focal Deficits] : no focal deficits [Impaired Insight] : insight and judgment were intact [Affect] : the affect was normal [FreeTextEntry1] : No synovitis;  right wrist w/ mild tenderness;  right shoulder w/pain upon abduction, internal rotation, (+)impingement;  B/L hips w/ pain upon internal rotation and abduction;  B/L knees w/ mild pain upon flexion

## 2019-07-26 NOTE — HISTORY OF PRESENT ILLNESS
[FreeTextEntry1] : Feeling "the same" overall.  Right wrist pain has improved overall though still occurs intermittently.  Right shoulder pain has improved though left knee pain is worse.  Also still w/ B/L hip pain, unchanged.  No new complaints.  Pt also reports hx of osteoporosis s/p prior fractures in multiple toes. [Anorexia] : no anorexia [Weight Loss] : no weight loss [Malaise] : no malaise [Fever] : no fever [Chills] : no chills [Fatigue] : no fatigue [Malar Facial Rash] : no malar facial rash [Skin Lesions] : no lesions [Skin Nodules] : no skin nodules [Oral Ulcers] : no oral ulcers [Cough] : no cough

## 2019-07-30 ENCOUNTER — APPOINTMENT (OUTPATIENT)
Dept: ORTHOPEDIC SURGERY | Facility: CLINIC | Age: 59
End: 2019-07-30
Payer: MEDICARE

## 2019-07-30 VITALS
SYSTOLIC BLOOD PRESSURE: 97 MMHG | HEART RATE: 60 BPM | BODY MASS INDEX: 19.63 KG/M2 | WEIGHT: 115 LBS | HEIGHT: 64 IN | DIASTOLIC BLOOD PRESSURE: 64 MMHG

## 2019-07-30 DIAGNOSIS — M77.9 ENTHESOPATHY, UNSPECIFIED: ICD-10-CM

## 2019-07-30 PROCEDURE — 99213 OFFICE O/P EST LOW 20 MIN: CPT

## 2019-07-31 LAB
ANION GAP SERPL CALC-SCNC: 15 MMOL/L
BUN SERPL-MCNC: 8 MG/DL
CALCIUM SERPL-MCNC: 9.6 MG/DL
CHLORIDE SERPL-SCNC: 97 MMOL/L
CO2 SERPL-SCNC: 29 MMOL/L
CREAT SERPL-MCNC: 1.02 MG/DL
FERRITIN SERPL-MCNC: 259 NG/ML
GLUCOSE SERPL-MCNC: 93 MG/DL
IRON SATN MFR SERPL: 30 %
IRON SERPL-MCNC: 69 UG/DL
POTASSIUM SERPL-SCNC: 3.1 MMOL/L
SODIUM SERPL-SCNC: 141 MMOL/L
TIBC SERPL-MCNC: 229 UG/DL
TRANSFERRIN SERPL-MCNC: 181 MG/DL
UIBC SERPL-MCNC: 160 UG/DL

## 2019-08-06 ENCOUNTER — RESULT REVIEW (OUTPATIENT)
Age: 59
End: 2019-08-06

## 2019-08-16 ENCOUNTER — APPOINTMENT (OUTPATIENT)
Dept: RHEUMATOLOGY | Facility: CLINIC | Age: 59
End: 2019-08-16
Payer: MEDICARE

## 2019-08-16 VITALS
HEART RATE: 62 BPM | HEIGHT: 64 IN | DIASTOLIC BLOOD PRESSURE: 60 MMHG | SYSTOLIC BLOOD PRESSURE: 90 MMHG | BODY MASS INDEX: 19.29 KG/M2 | WEIGHT: 113 LBS

## 2019-08-16 PROCEDURE — 99215 OFFICE O/P EST HI 40 MIN: CPT

## 2019-08-16 NOTE — HISTORY OF PRESENT ILLNESS
[Anorexia] : no anorexia [FreeTextEntry1] : Initial visit (6/14/19):\par 58 year old female with PMHx as listed below reports that she has been experiencing intermittent pain in her right wrist for the past 5-6 years.  The pain was typically worse with activity or with banging it.  About 2 weeks ago, she began to experience more severe pain in the wrist w/ mild swelling, warmth and erythema.  This recent pain has been constant, though now has begun to improve .  She describes the pain as burning and throbbing, previously 4-5 out of 10, though up to 8 out of 10 over the past 2 weeks.  (+) AM stiffness, usually lasting 15-20 minutes.  She tried taking Advil 400mg but it didn't help.  No known alleviating factors.\par Pt also c/o pain in her B/L hips and knees, primarily with walking and climbing stairs.   No swelling/erythema/warmth.  Also w/ right shoulder pain, worst with activity.\par No F/C, no unintentional weight loss, no night sweats, no oral ulcers, no rashes, no alopecia, no photosensitivity, no dry eyes/dry mouth, no Raynaud symptoms, no focal weakness, no dysphagia\par  [Weight Loss] : no weight loss [Malaise] : no malaise [Fever] : no fever [Fatigue] : no fatigue [Chills] : no chills [Skin Lesions] : no lesions [Malar Facial Rash] : no malar facial rash [Skin Nodules] : no skin nodules [Oral Ulcers] : no oral ulcers [Cough] : no cough

## 2019-08-16 NOTE — PHYSICAL EXAM
[General Appearance - Alert] : alert [General Appearance - In No Acute Distress] : in no acute distress [Sclera] : the sclera and conjunctiva were normal [Outer Ear] : the ears and nose were normal in appearance [Oropharynx] : the oropharynx was normal [Neck Appearance] : the appearance of the neck was normal [Jugular Venous Distention Increased] : there was no jugular-venous distention [Neck Cervical Mass (___cm)] : no neck mass was observed [Thyroid Diffuse Enlargement] : the thyroid was not enlarged [Thyroid Nodule] : there were no palpable thyroid nodules [Auscultation Breath Sounds / Voice Sounds] : lungs were clear to auscultation bilaterally [Heart Rate And Rhythm] : heart rate was normal and rhythm regular [Heart Sounds] : normal S1 and S2 [Murmurs] : no murmurs [Heart Sounds Gallop] : no gallops [Heart Sounds Pericardial Friction Rub] : no pericardial rub [Bowel Sounds] : normal bowel sounds [Edema] : there was no peripheral edema [Abdomen Soft] : soft [Abdomen Tenderness] : non-tender [Abdomen Mass (___ Cm)] : no abdominal mass palpated [Cervical Lymph Nodes Enlarged Posterior Bilaterally] : posterior cervical [Cervical Lymph Nodes Enlarged Anterior Bilaterally] : anterior cervical [Supraclavicular Lymph Nodes Enlarged Bilaterally] : supraclavicular [No Spinal Tenderness] : no spinal tenderness [Skin Turgor] : normal skin turgor [Skin Color & Pigmentation] : normal skin color and pigmentation [] : no rash [No Focal Deficits] : no focal deficits [Oriented To Time, Place, And Person] : oriented to person, place, and time [Impaired Insight] : insight and judgment were intact [Affect] : the affect was normal [FreeTextEntry1] : No synovitis;  right wrist w/ mild tenderness;  right shoulder w/pain upon abduction, internal rotation, (+)impingement;  B/L hips w/ pain upon internal rotation and abduction;  B/L knees w/ mild pain upon flexion

## 2019-08-16 NOTE — ASSESSMENT
[FreeTextEntry1] : 58 year old female with polyarticular joint pains which appear to be multifactorial:\par 1)  Right wrist s/p episode of acute on chronic pain:  etiology of chronic pain unclear, while acute symptoms most c/w inflammatory arthritis - most likely CPPD/pseudogout, given chondrocalcinosis on x-rays though would need arthrocentesis for definitive diagnosis (no effusion to aspirate at this time).  w/u reveals mildly elevated ferritin though normal iron level.\par   - As this was her first flare, preventive treatment not indicated at this time.\par 2)  Pain in B/L hips - due to OA;  also w/ B/L knee pain - x-rays unremarkable though pt reports hx of torn meniscus in left knee\par   - Reiterated importance of exercise\par   - ibuprofen and/or Tylenol prn\par   - warm compresses\par   - joint protection techniques\par   - OTC topical analgesics\par 3)  Right shoulder pain:  most c/w RTC tendinopathy - improving\par   - Cont shoulder exercises\par   - analgesia as above\par 4)  Osteoporosis w/ hx of multiple fractures.  Previously didn't tolerate Fosamax and failed Boniva IV.\par   - Demonstrated injection of Forteo to abdomen.\par 5)  Hypokalemia:  K+ still el despite increased intake of high-potassium foods.\par   - Advised pt to f/u w/ PMD.\par 6)  Vit D deficiency\par   - Cont vit D 50,000IU weekly.

## 2019-08-26 ENCOUNTER — FORM ENCOUNTER (OUTPATIENT)
Age: 59
End: 2019-08-26

## 2019-08-27 ENCOUNTER — OUTPATIENT (OUTPATIENT)
Dept: OUTPATIENT SERVICES | Facility: HOSPITAL | Age: 59
LOS: 1 days | End: 2019-08-27
Payer: MEDICARE

## 2019-08-27 ENCOUNTER — APPOINTMENT (OUTPATIENT)
Dept: MRI IMAGING | Facility: CLINIC | Age: 59
End: 2019-08-27
Payer: MEDICARE

## 2019-08-27 DIAGNOSIS — M25.531 PAIN IN RIGHT WRIST: ICD-10-CM

## 2019-08-27 DIAGNOSIS — Z00.00 ENCOUNTER FOR GENERAL ADULT MEDICAL EXAMINATION WITHOUT ABNORMAL FINDINGS: ICD-10-CM

## 2019-08-27 PROCEDURE — 73221 MRI JOINT UPR EXTREM W/O DYE: CPT | Mod: 26,RT

## 2019-08-27 PROCEDURE — 73221 MRI JOINT UPR EXTREM W/O DYE: CPT

## 2019-09-23 ENCOUNTER — APPOINTMENT (OUTPATIENT)
Dept: RHEUMATOLOGY | Facility: CLINIC | Age: 59
End: 2019-09-23
Payer: MEDICARE

## 2019-09-23 VITALS
HEIGHT: 64.5 IN | OXYGEN SATURATION: 98 % | SYSTOLIC BLOOD PRESSURE: 100 MMHG | DIASTOLIC BLOOD PRESSURE: 64 MMHG | RESPIRATION RATE: 17 BRPM | BODY MASS INDEX: 19.06 KG/M2 | WEIGHT: 113 LBS | HEART RATE: 77 BPM | TEMPERATURE: 98.8 F

## 2019-09-23 PROCEDURE — 99215 OFFICE O/P EST HI 40 MIN: CPT

## 2019-09-23 NOTE — PHYSICAL EXAM
[General Appearance - In No Acute Distress] : in no acute distress [General Appearance - Alert] : alert [Outer Ear] : the ears and nose were normal in appearance [Sclera] : the sclera and conjunctiva were normal [Oropharynx] : the oropharynx was normal [Neck Appearance] : the appearance of the neck was normal [Neck Cervical Mass (___cm)] : no neck mass was observed [Jugular Venous Distention Increased] : there was no jugular-venous distention [Thyroid Nodule] : there were no palpable thyroid nodules [Thyroid Diffuse Enlargement] : the thyroid was not enlarged [Auscultation Breath Sounds / Voice Sounds] : lungs were clear to auscultation bilaterally [Heart Sounds] : normal S1 and S2 [Heart Rate And Rhythm] : heart rate was normal and rhythm regular [Heart Sounds Gallop] : no gallops [Murmurs] : no murmurs [Heart Sounds Pericardial Friction Rub] : no pericardial rub [Edema] : there was no peripheral edema [Bowel Sounds] : normal bowel sounds [Abdomen Soft] : soft [Abdomen Tenderness] : non-tender [Abdomen Mass (___ Cm)] : no abdominal mass palpated [Cervical Lymph Nodes Enlarged Anterior Bilaterally] : anterior cervical [Cervical Lymph Nodes Enlarged Posterior Bilaterally] : posterior cervical [Supraclavicular Lymph Nodes Enlarged Bilaterally] : supraclavicular [No Spinal Tenderness] : no spinal tenderness [Skin Color & Pigmentation] : normal skin color and pigmentation [Skin Turgor] : normal skin turgor [] : no rash [Oriented To Time, Place, And Person] : oriented to person, place, and time [No Focal Deficits] : no focal deficits [Impaired Insight] : insight and judgment were intact [Affect] : the affect was normal [FreeTextEntry1] : No synovitis;  right wrist w/ mild tenderness;  right shoulder w/pain upon abduction, internal rotation, (+)impingement;  B/L hips w/ pain upon internal rotation and abduction;  B/L knees w/ mild pain upon flexion

## 2019-09-23 NOTE — HISTORY OF PRESENT ILLNESS
[FreeTextEntry1] : Pain in right wrist has improved overall, though still w/ occasional "flares" of more severe pain, primarily with activities placing strain on the wrist.  Hip pain also improved overall.  Pain currently worst in knees (L>R).  Has been performing Forteo injections without difficulty / no adverse effects.  No new complaints. [Anorexia] : no anorexia [Weight Loss] : no weight loss [Malaise] : no malaise [Fever] : no fever [Chills] : no chills [Fatigue] : no fatigue [Malar Facial Rash] : no malar facial rash [Skin Lesions] : no lesions [Skin Nodules] : no skin nodules [Oral Ulcers] : no oral ulcers [Cough] : no cough

## 2019-09-23 NOTE — ASSESSMENT
[FreeTextEntry1] : 58 year old female with polyarticular joint pains which appear to be multifactorial:\par 1)  Right wrist s/p episode of acute on chronic pain:  Acute symptoms most c/w inflammatory arthritis - most likely CPPD/pseudogout, given chondrocalcinosis on x-rays though would need arthrocentesis for definitive diagnosis (no effusion to aspirate at this time).  Etiology of chronic pain unclear.  MRI reveals tenosynovitis of the extensor carpi ulnaris and a moderate effusion of the radioulnar joint - ?was a remnant of recent flare, as no effusion upon exam at this time.\par   - Wrist exercises.\par   - ibuprofen prn.\par   - warm compresses\par   - OTC topical analgesics\par   - Advised pt to f/u if effusion recurs so that arthrocentesis can be performed.\par 2)  Pain in B/L hips - due to OA;  also w/ B/L knee pain (L>R) - prior MRI reveals a complex tear of the left medial meniscus\par   - Reiterated importance of exercise\par   - analgesia as above\par 3)  Right shoulder pain:  most c/w RTC tendinopathy - improving\par   - Cont shoulder exercises\par   - analgesia as above\par 4)  Osteoporosis w/ hx of multiple fractures.  Previously didn't tolerate Fosamax and failed Boniva IV.\par   - Cont Forteo (through 8/2021).\par   - calcium/vit D\par   - weight bearing exercise.\par 5)  Hypokalemia:  K+ still low on most recent bloodwork despite increased intake of high-potassium foods.\par   - Again advised pt to f/u w/ PMD.\par 6)  Vit D deficiency\par   - Cont vit D 50,000IU weekly.

## 2019-10-02 ENCOUNTER — MEDICATION RENEWAL (OUTPATIENT)
Age: 59
End: 2019-10-02

## 2019-11-13 ENCOUNTER — EMERGENCY (EMERGENCY)
Facility: HOSPITAL | Age: 59
LOS: 1 days | Discharge: HOME | End: 2019-11-13
Attending: EMERGENCY MEDICINE
Payer: MEDICARE

## 2019-11-13 VITALS
HEIGHT: 64 IN | SYSTOLIC BLOOD PRESSURE: 133 MMHG | WEIGHT: 115.08 LBS | OXYGEN SATURATION: 98 % | TEMPERATURE: 98 F | RESPIRATION RATE: 18 BRPM | HEART RATE: 85 BPM | DIASTOLIC BLOOD PRESSURE: 83 MMHG

## 2019-11-13 DIAGNOSIS — Z98.891 HISTORY OF UTERINE SCAR FROM PREVIOUS SURGERY: Chronic | ICD-10-CM

## 2019-11-13 DIAGNOSIS — Z90.710 ACQUIRED ABSENCE OF BOTH CERVIX AND UTERUS: Chronic | ICD-10-CM

## 2019-11-13 PROCEDURE — 99283 EMERGENCY DEPT VISIT LOW MDM: CPT

## 2019-11-13 PROCEDURE — 73110 X-RAY EXAM OF WRIST: CPT

## 2019-11-13 PROCEDURE — 73090 X-RAY EXAM OF FOREARM: CPT | Mod: 26,LT

## 2019-11-13 PROCEDURE — 99284 EMERGENCY DEPT VISIT MOD MDM: CPT

## 2019-11-13 PROCEDURE — 73090 X-RAY EXAM OF FOREARM: CPT

## 2019-11-13 PROCEDURE — 73110 X-RAY EXAM OF WRIST: CPT | Mod: 26,LT

## 2019-11-13 RX ORDER — IBUPROFEN 200 MG
600 TABLET ORAL ONCE
Refills: 0 | Status: COMPLETED | OUTPATIENT
Start: 2019-11-13 | End: 2019-11-13

## 2019-11-13 RX ADMIN — Medication 600 MILLIGRAM(S): at 22:11

## 2019-11-13 NOTE — ED PROVIDER NOTE - PHYSICAL EXAMINATION
Vital signs noted, see flowsheet.  General: Well nourished/developed. In no acute distress, well appearing and non-toxic.  HEENT: NC/AT. MMM. EOMI. PERRL.  Neck: Soft and supple  Cardiac: RRR. +S1/S2. Peripheral pulses 2+ and symmetric b/l.   Respiratory: Lungs CTA b/l  Abdomen: Soft, NTND.   Back: Spine midline and non-tender.  Neuro: Awake, alert and oriented to person/place/time/situation.  Left upper extremity: No visible or palpable deformity. Dorsal mid-forearm with 2cm area of ecchymosis- no abrasion or laceration, mild TTP over site. Wrist nontender to palpation. No snuffbox tenderness. +FROM of elbow, wrist, hand and fingers. No motor or sensory deficits of extremity. Sensation intact. 2+ pulse b/l. Cap refill < 2 seconds. Vital signs noted, see flowsheet.  General: Well nourished/developed. In no acute distress, well appearing and non-toxic.  HEENT: NC/AT. MMM. EOMI. PERRL.  Neck: Soft and supple  Cardiac: RRR. +S1/S2. Peripheral pulses 2+ and symmetric b/l.   Respiratory: Lungs CTA b/l  Abdomen: Soft, NTND.   Back: Spine midline and non-tender.  Neuro: Awake, alert and oriented to person/place/time/situation.  Left upper extremity: No visible or palpable deformity. Dorsal mid-forearm with 2cm area of ecchymosis and some swelling, soft and compressible- no abrasion or laceration, mild TTP over site. Wrist/elbow/fingers nontender to palpation. No snuffbox tenderness. +FROM of elbow, wrist, hand and fingers. No motor or sensory deficits of extremity. Sensation intact. 2+ pulse b/l. Cap refill < 2 seconds.

## 2019-11-13 NOTE — ED PROVIDER NOTE - OBJECTIVE STATEMENT
58 y/o F with PMHx myasthenia gravis, osteoporosis, trigeminal neuralgia presents to ED c/o left arm pain. Pt reports she tripped and fell into a metal baby gate, hitting her left arm only, 2 hours PTA. Did not fall down or hit head, no LOC, no other injuries. No medications for pain PTA. C/o 8/10 aching left forearm pain. Pt is right handed. No fever/chills, numbness/tingling, weakness, CP, abd pain.

## 2019-11-13 NOTE — ED PROVIDER NOTE - CLINICAL SUMMARY MEDICAL DECISION MAKING FREE TEXT BOX
59 F with left arm pain s/p injury today with bruise/swelling  -Will check XR, motrin  -Will follow up and re-evaluate patient

## 2019-11-13 NOTE — ED ADULT NURSE NOTE - NSIMPLEMENTINTERV_GEN_ALL_ED
Implemented All Fall Risk Interventions:  [place] to call system. Call bell, personal items and telephone within reach. Instruct patient to call for assistance. Room bathroom lighting operational. Non-slip footwear when patient is off stretcher. Physically safe environment: no spills, clutter or unnecessary equipment. Stretcher in lowest position, wheels locked, appropriate side rails in place. Provide visual cue, wrist band, yellow gown, etc. Monitor gait and stability. Monitor for mental status changes and reorient to person, place, and time. Review medications for side effects contributing to fall risk. Reinforce activity limits and safety measures with patient and family.

## 2019-11-13 NOTE — ED PROVIDER NOTE - CHPI ED SYMPTOMS NEG
no difficulty bearing weight/no abrasion/no deformity/no stiffness/no tingling/no weakness/no numbness/no laceration

## 2019-11-13 NOTE — ED PROVIDER NOTE - PATIENT PORTAL LINK FT
You can access the FollowMyHealth Patient Portal offered by [facility] by registering at the following website: [email or web address]. By joining [facility]’s FollowMyHealth portal, you will also be able to view your health information using other applications (apps) compatible with our system.

## 2019-11-13 NOTE — ED PROVIDER NOTE - PROGRESS NOTE DETAILS
JIM Squires NOTE: Reviewed XR with Dr. Keller- no acute fx or dislocation. Pt stable for d/c, VSS, tolerating PO, ambulatory.  Discussion includes results, plan, and return precautions. Pt advised to f/u with PMD 1-2 days and orthopedist.  Pt verbalized understanding/agreement of plan.

## 2019-11-13 NOTE — ED ADULT TRIAGE NOTE - CHIEF COMPLAINT QUOTE
" I tripped over my cat and hit my arm on a metal baby gate" C/o left forearm pain. Mild swelling and ecchymosis noted.

## 2019-11-13 NOTE — ED PROVIDER NOTE - ATTENDING CONTRIBUTION TO CARE
59 year old woman c/o left arm pain s/p trip and fall.  No LOC.  Patient well appearing, no acute distress, no deformity on exam, tenderness and ecchymosis noted at mid forearm.  Xray negative for fracture.  She was given medication for pain and discharged.

## 2020-01-02 ENCOUNTER — APPOINTMENT (OUTPATIENT)
Dept: RHEUMATOLOGY | Facility: CLINIC | Age: 60
End: 2020-01-02
Payer: MEDICARE

## 2020-01-02 VITALS
DIASTOLIC BLOOD PRESSURE: 70 MMHG | HEIGHT: 64.5 IN | OXYGEN SATURATION: 98 % | BODY MASS INDEX: 20.24 KG/M2 | RESPIRATION RATE: 17 BRPM | HEART RATE: 71 BPM | WEIGHT: 120 LBS | TEMPERATURE: 98 F | SYSTOLIC BLOOD PRESSURE: 104 MMHG

## 2020-01-02 PROBLEM — M81.0 AGE-RELATED OSTEOPOROSIS WITHOUT CURRENT PATHOLOGICAL FRACTURE: Chronic | Status: ACTIVE | Noted: 2019-11-13

## 2020-01-02 PROCEDURE — 36415 COLL VENOUS BLD VENIPUNCTURE: CPT

## 2020-01-02 PROCEDURE — 99215 OFFICE O/P EST HI 40 MIN: CPT | Mod: 25

## 2020-01-02 RX ORDER — MELOXICAM 7.5 MG/1
7.5 TABLET ORAL
Qty: 30 | Refills: 1 | Status: DISCONTINUED | COMMUNITY
Start: 2019-03-12 | End: 2020-01-02

## 2020-01-02 NOTE — PHYSICAL EXAM
[General Appearance - Alert] : alert [General Appearance - In No Acute Distress] : in no acute distress [Sclera] : the sclera and conjunctiva were normal [Outer Ear] : the ears and nose were normal in appearance [Oropharynx] : the oropharynx was normal [Neck Appearance] : the appearance of the neck was normal [Neck Cervical Mass (___cm)] : no neck mass was observed [Jugular Venous Distention Increased] : there was no jugular-venous distention [Thyroid Diffuse Enlargement] : the thyroid was not enlarged [Thyroid Nodule] : there were no palpable thyroid nodules [Heart Rate And Rhythm] : heart rate was normal and rhythm regular [Auscultation Breath Sounds / Voice Sounds] : lungs were clear to auscultation bilaterally [Heart Sounds] : normal S1 and S2 [Murmurs] : no murmurs [Heart Sounds Gallop] : no gallops [Heart Sounds Pericardial Friction Rub] : no pericardial rub [Edema] : there was no peripheral edema [Bowel Sounds] : normal bowel sounds [Abdomen Soft] : soft [Abdomen Tenderness] : non-tender [Cervical Lymph Nodes Enlarged Posterior Bilaterally] : posterior cervical [Abdomen Mass (___ Cm)] : no abdominal mass palpated [Cervical Lymph Nodes Enlarged Anterior Bilaterally] : anterior cervical [Supraclavicular Lymph Nodes Enlarged Bilaterally] : supraclavicular [No Spinal Tenderness] : no spinal tenderness [Skin Color & Pigmentation] : normal skin color and pigmentation [Skin Turgor] : normal skin turgor [] : no rash [No Focal Deficits] : no focal deficits [Oriented To Time, Place, And Person] : oriented to person, place, and time [Impaired Insight] : insight and judgment were intact [Affect] : the affect was normal [FreeTextEntry1] : No synovitis;  right wrist w/ minimal tenderness;  B/L hips w/ pain upon internal rotation and abduction;  B/L knees w/ mild pain upon flexion;  right ankle w/ mild tenderness laterally

## 2020-01-02 NOTE — HISTORY OF PRESENT ILLNESS
[Anorexia] : no anorexia [FreeTextEntry1] : Pt reports that she fell about 6 weeks ago, landing on her right wrist. She went to [place] where x-rays were negative.  The pain has now improved.  Last week, she began to experience pain, swelling, and erythema in her right foot - she went to an urgent care center, where she was started on diclofenac, and the symptoms have greatly improved.  Otherwise, feeling fine overall since last visit.  No other complaints.  \par Pain in right wrist has improved overall, though still w/ occasional "flares" of more severe pain, primarily with activities placing strain on the wrist.  Hip pain also improved overall.  Pain currently worst in knees (L>R).  Has been performing Forteo injections without difficulty / no adverse effects.  No new complaints. [Weight Loss] : no weight loss [Malaise] : no malaise [Fever] : no fever [Chills] : no chills [Fatigue] : no fatigue [Malar Facial Rash] : no malar facial rash [Skin Lesions] : no lesions [Skin Nodules] : no skin nodules [Oral Ulcers] : no oral ulcers [Cough] : no cough

## 2020-01-02 NOTE — ASSESSMENT
[FreeTextEntry1] : 58 year old female with polyarticular joint pains which appear to be multifactorial:\par 1)  Right wrist s/p episode of acute on chronic pain:  Acute symptoms most c/w inflammatory arthritis - most likely CPPD/pseudogout, given chondrocalcinosis on x-rays though would need arthrocentesis for definitive diagnosis (no effusion to aspirate at this time).  Etiology of chronic pain unclear.  MRI reveals tenosynovitis of the extensor carpi ulnaris and a moderate effusion of the radioulnar joint - ?was a remnant of recent flare, as no effusion upon exam at this time.\par   - Wrist exercises.\par   - ibuprofen prn.\par   - warm compresses\par   - OTC topical analgesics\par   - Advised pt to f/u if effusion recurs so that arthrocentesis can be performed.\par 2)  Pain in B/L hips - due to OA;  also w/ B/L knee pain (L>R) - prior MRI reveals a complex tear of the left medial meniscus\par   - Reiterated importance of exercise\par   - analgesia as above\par 3)  Right shoulder pain:  most c/w RTC tendinopathy - improving\par   - Cont shoulder exercises\par   - analgesia as above\par 4)  Osteoporosis w/ hx of multiple fractures.  Previously didn't tolerate Fosamax and failed Boniva IV.\par   - Cont Forteo (through 8/2021).\par   - calcium/vit D\par   - weight bearing exercise.\par 5)  Hypokalemia:  K+ still low on most recent bloodwork despite increased intake of high-potassium foods.\par   - Recheck CMP.\par 6)  Vit D deficiency. \par   - Recheck vit D 25-OH\par 7)  Right foot pain:  much improved on diclofenac - ?CPPD/pseudogout vs other crystal arthropathy\par   - Pt to call if symptoms recur.

## 2020-01-09 LAB
25(OH)D3 SERPL-MCNC: 23.1 NG/ML
ALBUMIN SERPL ELPH-MCNC: 4.3 G/DL
ALP BLD-CCNC: 68 U/L
ALT SERPL-CCNC: 10 U/L
ANION GAP SERPL CALC-SCNC: 13 MMOL/L
AST SERPL-CCNC: 18 U/L
BASOPHILS # BLD AUTO: 0.05 K/UL
BASOPHILS NFR BLD AUTO: 0.9 %
BILIRUB SERPL-MCNC: 0.2 MG/DL
BUN SERPL-MCNC: 10 MG/DL
CALCIUM SERPL-MCNC: 9.6 MG/DL
CHLORIDE SERPL-SCNC: 100 MMOL/L
CO2 SERPL-SCNC: 25 MMOL/L
CREAT SERPL-MCNC: 0.9 MG/DL
EOSINOPHIL # BLD AUTO: 0.09 K/UL
EOSINOPHIL NFR BLD AUTO: 1.7 %
GLUCOSE SERPL-MCNC: 88 MG/DL
HCT VFR BLD CALC: 38.8 %
HGB BLD-MCNC: 12.7 G/DL
IMM GRANULOCYTES NFR BLD AUTO: 0.4 %
LYMPHOCYTES # BLD AUTO: 2.03 K/UL
LYMPHOCYTES NFR BLD AUTO: 38.2 %
MAN DIFF?: NORMAL
MCHC RBC-ENTMCNC: 29.1 PG
MCHC RBC-ENTMCNC: 32.7 GM/DL
MCV RBC AUTO: 89 FL
MONOCYTES # BLD AUTO: 0.3 K/UL
MONOCYTES NFR BLD AUTO: 5.6 %
NEUTROPHILS # BLD AUTO: 2.82 K/UL
NEUTROPHILS NFR BLD AUTO: 53.2 %
PLATELET # BLD AUTO: 404 K/UL
POTASSIUM SERPL-SCNC: 3.6 MMOL/L
PROT SERPL-MCNC: 6.6 G/DL
RBC # BLD: 4.36 M/UL
RBC # FLD: 12.5 %
SODIUM SERPL-SCNC: 138 MMOL/L
WBC # FLD AUTO: 5.31 K/UL

## 2020-06-01 ENCOUNTER — TRANSCRIPTION ENCOUNTER (OUTPATIENT)
Age: 60
End: 2020-06-01

## 2020-06-18 ENCOUNTER — APPOINTMENT (OUTPATIENT)
Dept: RHEUMATOLOGY | Facility: CLINIC | Age: 60
End: 2020-06-18
Payer: MEDICARE

## 2020-06-18 VITALS
HEIGHT: 64.5 IN | DIASTOLIC BLOOD PRESSURE: 60 MMHG | SYSTOLIC BLOOD PRESSURE: 92 MMHG | RESPIRATION RATE: 17 BRPM | TEMPERATURE: 98 F | BODY MASS INDEX: 20.24 KG/M2 | WEIGHT: 120 LBS | OXYGEN SATURATION: 97 % | HEART RATE: 96 BPM

## 2020-06-18 PROCEDURE — 36415 COLL VENOUS BLD VENIPUNCTURE: CPT

## 2020-06-18 PROCEDURE — 99215 OFFICE O/P EST HI 40 MIN: CPT | Mod: 25

## 2020-06-18 NOTE — ASSESSMENT
[FreeTextEntry1] : 58 year old female with polyarticular joint pains which appear to be multifactorial:\par 1)  Right wrist s/p episode of acute on chronic pain:  Acute symptoms most c/w inflammatory arthritis - most likely CPPD/pseudogout, given chondrocalcinosis on x-rays though would need arthrocentesis for definitive diagnosis (no effusion to aspirate at this time).  Etiology of chronic pain unclear.  MRI reveals tenosynovitis of the extensor carpi ulnaris and a moderate effusion of the radioulnar joint - ?was a remnant of recent flare, as no effusion upon exam at this time.\par   - Wrist exercises.\par   - ibuprofen prn.\par   - warm compresses\par   - OTC topical analgesics\par   - Advised pt to f/u if effusion recurs so that arthrocentesis can be performed.\par 2)  Pain in B/L hips - due to OA;  also w/ B/L knee pain (L>R) - prior MRI reveals a complex tear of the left medial meniscus\par   - Reiterated importance of exercise\par   - analgesia as above\par 3)  Right shoulder pain:  most c/w RTC tendinopathy - improving\par   - Cont shoulder exercises\par   - analgesia as above\par 4)  Osteoporosis w/ hx of multiple fractures.  Previously didn't tolerate Fosamax and failed Boniva IV.\par   - Cont Forteo (through 8/2021).\par   - calcium/vit D\par   - weight bearing exercise.\par 5)  Hypokalemia: resolved on most recent labs.\par 6)  Vit D deficiency: \par   - Recheck vit D 25-OH\par 7)  Right foot pain:  much improved on diclofenac - ?CPPD/pseudogout vs other crystal arthropathy\par   - Pt to call if symptoms recur.\par 8)  Recent episode of pain/swelling/erythema on right forearm and wrist.  Pt's description more c/w cellulitis than crystal arthritis.\par   - Advised pt to call me immediately if symptoms recur.

## 2020-06-18 NOTE — PHYSICAL EXAM
[Sclera] : the sclera and conjunctiva were normal [General Appearance - In No Acute Distress] : in no acute distress [General Appearance - Alert] : alert [Outer Ear] : the ears and nose were normal in appearance [Oropharynx] : the oropharynx was normal [Neck Appearance] : the appearance of the neck was normal [Neck Cervical Mass (___cm)] : no neck mass was observed [Jugular Venous Distention Increased] : there was no jugular-venous distention [Thyroid Diffuse Enlargement] : the thyroid was not enlarged [Thyroid Nodule] : there were no palpable thyroid nodules [Auscultation Breath Sounds / Voice Sounds] : lungs were clear to auscultation bilaterally [Heart Rate And Rhythm] : heart rate was normal and rhythm regular [Heart Sounds] : normal S1 and S2 [Heart Sounds Gallop] : no gallops [Murmurs] : no murmurs [Heart Sounds Pericardial Friction Rub] : no pericardial rub [Edema] : there was no peripheral edema [Bowel Sounds] : normal bowel sounds [Abdomen Soft] : soft [Abdomen Tenderness] : non-tender [Cervical Lymph Nodes Enlarged Posterior Bilaterally] : posterior cervical [Abdomen Mass (___ Cm)] : no abdominal mass palpated [Cervical Lymph Nodes Enlarged Anterior Bilaterally] : anterior cervical [Supraclavicular Lymph Nodes Enlarged Bilaterally] : supraclavicular [No Spinal Tenderness] : no spinal tenderness [Skin Color & Pigmentation] : normal skin color and pigmentation [] : no rash [Skin Turgor] : normal skin turgor [No Focal Deficits] : no focal deficits [Impaired Insight] : insight and judgment were intact [Oriented To Time, Place, And Person] : oriented to person, place, and time [Affect] : the affect was normal [FreeTextEntry1] : No synovitis;  right wrist w/ minimal tenderness;  B/L hips w/ pain upon internal rotation and abduction;  B/L knees w/ mild pain upon flexion;  right ankle w/ mild tenderness laterally

## 2020-06-18 NOTE — HISTORY OF PRESENT ILLNESS
[Anorexia] : no anorexia [FreeTextEntry1] : Pt reports that about 2 weeks ago, she started to experience severe pain, erythema, and swelling in her forearm, which soon spread over her lateral wrist.   She went to urgent care, who felt she had either cellulitis or gout.  She was treated with abx and 1 dose of dexamethasone PO.  The symptoms then resolved after about 10 days.  She also reports that 2 days ago, she experiencing acute left knee pain, which then resolved after 1 day.   Has been performing Forteo injections without difficulty / no adverse effects.  No new complaints. [Weight Loss] : no weight loss [Malaise] : no malaise [Fever] : no fever [Chills] : no chills [Fatigue] : no fatigue [Skin Lesions] : no lesions [Malar Facial Rash] : no malar facial rash [Skin Nodules] : no skin nodules [Oral Ulcers] : no oral ulcers [Cough] : no cough

## 2020-06-19 LAB
25(OH)D3 SERPL-MCNC: 33.8 NG/ML
ALBUMIN SERPL ELPH-MCNC: 3.9 G/DL
ALP BLD-CCNC: 83 U/L
ALT SERPL-CCNC: 8 U/L
ANION GAP SERPL CALC-SCNC: 13 MMOL/L
AST SERPL-CCNC: 14 U/L
BASOPHILS # BLD AUTO: 0.04 K/UL
BASOPHILS NFR BLD AUTO: 0.6 %
BILIRUB SERPL-MCNC: 0.2 MG/DL
BUN SERPL-MCNC: 9 MG/DL
CALCIUM SERPL-MCNC: 8.8 MG/DL
CHLORIDE SERPL-SCNC: 101 MMOL/L
CO2 SERPL-SCNC: 25 MMOL/L
CREAT SERPL-MCNC: 0.88 MG/DL
EOSINOPHIL # BLD AUTO: 0.09 K/UL
EOSINOPHIL NFR BLD AUTO: 1.5 %
GLUCOSE SERPL-MCNC: 92 MG/DL
HCT VFR BLD CALC: 29.8 %
HGB BLD-MCNC: 9 G/DL
IMM GRANULOCYTES NFR BLD AUTO: 0.2 %
LYMPHOCYTES # BLD AUTO: 2.55 K/UL
LYMPHOCYTES NFR BLD AUTO: 41.2 %
MAN DIFF?: NORMAL
MCHC RBC-ENTMCNC: 23.3 PG
MCHC RBC-ENTMCNC: 30.2 GM/DL
MCV RBC AUTO: 77 FL
MONOCYTES # BLD AUTO: 0.38 K/UL
MONOCYTES NFR BLD AUTO: 6.1 %
NEUTROPHILS # BLD AUTO: 3.12 K/UL
NEUTROPHILS NFR BLD AUTO: 50.4 %
PLATELET # BLD AUTO: 405 K/UL
POTASSIUM SERPL-SCNC: 3.6 MMOL/L
PROT SERPL-MCNC: 6 G/DL
RBC # BLD: 3.87 M/UL
RBC # FLD: 17.2 %
SODIUM SERPL-SCNC: 139 MMOL/L
WBC # FLD AUTO: 6.19 K/UL

## 2020-06-22 ENCOUNTER — TRANSCRIPTION ENCOUNTER (OUTPATIENT)
Age: 60
End: 2020-06-22

## 2020-06-24 ENCOUNTER — APPOINTMENT (OUTPATIENT)
Dept: ORTHOPEDIC SURGERY | Facility: CLINIC | Age: 60
End: 2020-06-24
Payer: MEDICARE

## 2020-06-24 VITALS
SYSTOLIC BLOOD PRESSURE: 101 MMHG | BODY MASS INDEX: 20.49 KG/M2 | DIASTOLIC BLOOD PRESSURE: 69 MMHG | HEART RATE: 70 BPM | WEIGHT: 120 LBS | HEIGHT: 64 IN

## 2020-06-24 DIAGNOSIS — S83.242A OTHER TEAR OF MEDIAL MENISCUS, CURRENT INJURY, LEFT KNEE, INITIAL ENCOUNTER: ICD-10-CM

## 2020-06-24 PROCEDURE — 99214 OFFICE O/P EST MOD 30 MIN: CPT | Mod: 25

## 2020-06-24 PROCEDURE — 20610 DRAIN/INJ JOINT/BURSA W/O US: CPT | Mod: LT

## 2020-06-24 NOTE — DISCUSSION/SUMMARY
[de-identified] : 59 year female presents with mild tricompartmental osteoarthritis of the bilateral knees, calcification of medial and lateral menisci bilaterally. We discussed the nature of the condition and the treatment options.\par \par Based on imaging and her well controlled symptoms I recommended she continue with conservative treatment. We discussed treatment with physical therapy and cortisone injections\par \par She elected to receive a cortisone injection in her left knee which she tolerated well. she deferred PT due to no improvement in the past\par She is a potential candidate for left knee arthroscopy based on her prior MRI.\par I ordered an updated MRI of left knee today.\par The percentages of success in an arthroscopy that involves a torn meniscus and arthritic changes is dependent upon how bad the arthritic changes are. Basically, removing a meniscal tear allows us to ascertain how bad the patient's articular cartilage destruction (arthritis) is. The arthroscopy cleans out any debris from the arthritic process as well as removing the meniscal tear. Approximately 75% of the patients will say that they feel relief, although their x-rays will continue to show significant arthritic changes. Arthroscopy for arthritis is a temporizing procedure, yielding subjective success (patient satisfaction) for less than two to five years. In some cases, the knee might eventually require a knee replacement for symptomatic relief. The prognostic factors that are somewhat favorable predictive values in arthroscopic debridements (removal of loose articular cartilage, loose body and inflamed synovium) of an arthritic knee are: short duration of symptoms, effusion (swelling), minimal deformity and good range of motion. The complications with any arthroscopy include the risk of anaesthetic complications and death, blood clots and pulmonary embolus, infection (less than 1%), nerve damage, by which we would mean a peroneal palsy (less than 0.1%) (small area of skin numbness is so common, we do not consider its presence a complication), injury to the popliteal artery, which is so rare that there are no statistics, but should it occur could theoretically lead to amputation, which is extremely unlikely. There is often a chance of getting a hemarthrosis (blood in the joint) but this usually resolves with local measures of icing, physical therapy, and aspiration. Reflex sympathetic dystrophy (RSD) is another extremely rare but theoretical complication. This (RSD) means that the patient has a stiff painful joint that is out of proportion to the objective pathology of the knee. Subsequently, it might require years of physical therapy before one regains a functional knee with RSD. Infrapatellar contracture syndrome (stiff joint) is sometimes reported and associated with RSD, but it usually is a result of not being aggressive in physical therapy. I think the patient understands the risk benefit ratio of arthroscopy and will think about whether they would prefer the nonoperative or surgical treatment option.\par \par \par \par

## 2020-06-24 NOTE — PROCEDURE
[de-identified] : pt received left knee cortisone injection \par \par I discussed at length with the patient the planned steroid and lidocaine injection. The risks, benefits, convalescence and alternatives were reviewed. The possible side effects discussed included but were not limited to: pain, swelling, heat, bleeding, and redness. Symptoms are generally mild but if they are extensive then contact the office. Giving pain relievers by mouth such as NSAIDs or Tylenol can generally treat the reactions to steroid and lidocaine. Rare cases of infection have been noted. Rash, hives and itching may occur post injection. If you have muscle pain or cramps, flushing and or swelling of the face, rapid heart beat, nausea, dizziness, fever, chills, headache, difficulty breathing, swelling in the arms or legs, or have a prickly feeling of your skin, contact a health care provider immediately. Following this discussion, the knee was prepped with Alcohol and under sterile condition the 80 mg Depo-Medrol and 6 cc Lidocaine injection was performed with a 20 gauge needle through a superolateral injection site. The needle was introduced into the joint, aspiration was performed to ensure intra-articular placement and the medication was injected. Upon withdrawal of the needle the site was cleaned with alcohol and a band aid applied. The patient tolerated the injection well and there were no adverse effects. Post injection instructions included no strenuous activity for 24 hours, cryotherapy and if there are any adverse effects to contact the office. \par \par

## 2020-06-24 NOTE — HISTORY OF PRESENT ILLNESS
[Pain Location] : pain [Worsening] : worsening [___ yrs] : [unfilled] year(s) ago [3] : a current pain level of 3/10 [Walking] : worsened by walking [Knee Flexion] : worsened with knee flexion [Rest] : relieved by rest [de-identified] : 59 year old female presents for f/u evaluation of bilateral knee pain, left worse than right. She notes that navigating stairs and activity in general exacerbates her pain. Ascending stairs also results in crepitus. She reports a injury when she stepped on a skateboard and fell, resulting in a worsening in symptoms. \par She was last seen in our office on 4/22/2019 and 2/24/2016. She received a cortisone injection at last with good relief. She has also taken Meloxicam with a good response. \par pt has pain with steps, and one day she had difficultly with extension. \par \par She states the symptoms are stable. The patient mentions the symptoms started 4 year(s) ago. Pain levels include a current pain level of 3/10. \par Her symptoms occur while walking and standing. She states the symptoms seem to be intermittent. \par \par Modifying factors - worsened by bending. Relieving factors include relieved by nonsteroidal anti-inflammatory drugs, relieved by recumbency and relieved by rest . cortisone injection. \par

## 2020-06-24 NOTE — PHYSICAL EXAM
[LE] : Sensory: Intact in bilateral lower extremities [ALL] : dorsalis pedis, posterior tibial, femoral, popliteal, and radial 2+ and symmetric bilaterally [Normal] : Gait: normal [Antalgic] : not antalgic [de-identified] : GENERAL APPEARANCE: Well nourished and hydrated, pleasant, alert, and oriented x 3. Appears their stated age. \par HEENT: Normocephalic, extraocular eye motion intact. Nasal septum midline. Oral cavity clear. External auditory canal clear. \par RESPIRATORY: Breath sounds clear and audible in all lobes. No wheezing, No accessory muscle use.\par CARDIOVASCULAR: No apparent abnormalities. No lower leg edema. No varicosities. Pedal pulses are palpable.\par NEUROLOGIC: Sensation is normal, no muscle weakness in the upper or lower extremities.\par DERMATOLOGIC: No apparent skin lesions, moist, warm, no rash.\par SPINE: Cervical spine appears normal and moves freely; thoracic spine appears normal and moves freely; lumbosacral spine appears normal and moves freely, normal, nontender.\par MUSCULOSKELETAL: Hands, wrists, and elbows are normal and move freely, shoulders are normal and move freely. \par Musculoskeletal: Gait: normal and not antalgic \par \par b/l knee examination showed medial and lateral joint tenderness, left worse then right\par ROM is 0-130 degree. no effusion.\par \par 5/5 motor strength in bilateral lower extremities. Sensory: Intact in bilateral lower extremities. DTRs: Biceps, brachioradialis, triceps, patellar, ankle and plantar 2+ and symmetric bilaterally. Pulses: dorsalis pedis, posterior tibial, femoral, popliteal, and radial 2+ and symmetric bilaterally. \par Constitutional: well-appearing, not in acute distress and not obese. \par

## 2020-08-05 ENCOUNTER — TRANSCRIPTION ENCOUNTER (OUTPATIENT)
Age: 60
End: 2020-08-05

## 2020-09-30 ENCOUNTER — INPATIENT (INPATIENT)
Facility: HOSPITAL | Age: 60
LOS: 2 days | Discharge: HOME | DRG: 641 | End: 2020-10-03
Attending: INTERNAL MEDICINE | Admitting: INTERNAL MEDICINE
Payer: MEDICARE

## 2020-09-30 VITALS
HEART RATE: 92 BPM | RESPIRATION RATE: 18 BRPM | DIASTOLIC BLOOD PRESSURE: 83 MMHG | HEIGHT: 64 IN | OXYGEN SATURATION: 98 % | WEIGHT: 115.08 LBS | SYSTOLIC BLOOD PRESSURE: 123 MMHG | TEMPERATURE: 99 F

## 2020-09-30 DIAGNOSIS — Z90.710 ACQUIRED ABSENCE OF BOTH CERVIX AND UTERUS: Chronic | ICD-10-CM

## 2020-09-30 DIAGNOSIS — E87.6 HYPOKALEMIA: ICD-10-CM

## 2020-09-30 DIAGNOSIS — Z98.891 HISTORY OF UTERINE SCAR FROM PREVIOUS SURGERY: Chronic | ICD-10-CM

## 2020-09-30 LAB
ALBUMIN SERPL ELPH-MCNC: 3.4 G/DL — SIGNIFICANT CHANGE UP (ref 3.3–5.2)
ALP SERPL-CCNC: 96 U/L — SIGNIFICANT CHANGE UP (ref 40–120)
ALT FLD-CCNC: 11 U/L — SIGNIFICANT CHANGE UP
ANION GAP SERPL CALC-SCNC: 13 MMOL/L — SIGNIFICANT CHANGE UP (ref 5–17)
ANISOCYTOSIS BLD QL: SLIGHT — SIGNIFICANT CHANGE UP
AST SERPL-CCNC: 23 U/L — SIGNIFICANT CHANGE UP
BASOPHILS # BLD AUTO: 0 K/UL — SIGNIFICANT CHANGE UP (ref 0–0.2)
BASOPHILS NFR BLD AUTO: 0 % — SIGNIFICANT CHANGE UP (ref 0–2)
BILIRUB SERPL-MCNC: <0.2 MG/DL — LOW (ref 0.4–2)
BUN SERPL-MCNC: 7 MG/DL — LOW (ref 8–20)
CALCIUM SERPL-MCNC: 8.1 MG/DL — LOW (ref 8.6–10.2)
CHLORIDE SERPL-SCNC: 100 MMOL/L — SIGNIFICANT CHANGE UP (ref 98–107)
CO2 SERPL-SCNC: 26 MMOL/L — SIGNIFICANT CHANGE UP (ref 22–29)
CREAT SERPL-MCNC: 0.9 MG/DL — SIGNIFICANT CHANGE UP (ref 0.5–1.3)
EOSINOPHIL # BLD AUTO: 0.05 K/UL — SIGNIFICANT CHANGE UP (ref 0–0.5)
EOSINOPHIL NFR BLD AUTO: 0.9 % — SIGNIFICANT CHANGE UP (ref 0–6)
GIANT PLATELETS BLD QL SMEAR: PRESENT — SIGNIFICANT CHANGE UP
GLUCOSE SERPL-MCNC: 110 MG/DL — HIGH (ref 70–99)
HCT VFR BLD CALC: 35.4 % — SIGNIFICANT CHANGE UP (ref 34.5–45)
HGB BLD-MCNC: 11.8 G/DL — SIGNIFICANT CHANGE UP (ref 11.5–15.5)
LYMPHOCYTES # BLD AUTO: 2.62 K/UL — SIGNIFICANT CHANGE UP (ref 1–3.3)
LYMPHOCYTES # BLD AUTO: 44.7 % — HIGH (ref 13–44)
MAGNESIUM SERPL-MCNC: 1.7 MG/DL — SIGNIFICANT CHANGE UP (ref 1.6–2.6)
MANUAL SMEAR VERIFICATION: SIGNIFICANT CHANGE UP
MCHC RBC-ENTMCNC: 26.5 PG — LOW (ref 27–34)
MCHC RBC-ENTMCNC: 33.3 GM/DL — SIGNIFICANT CHANGE UP (ref 32–36)
MCV RBC AUTO: 79.6 FL — LOW (ref 80–100)
MICROCYTES BLD QL: SLIGHT — SIGNIFICANT CHANGE UP
MONOCYTES # BLD AUTO: 0.26 K/UL — SIGNIFICANT CHANGE UP (ref 0–0.9)
MONOCYTES NFR BLD AUTO: 4.4 % — SIGNIFICANT CHANGE UP (ref 2–14)
NEUTROPHILS # BLD AUTO: 2.83 K/UL — SIGNIFICANT CHANGE UP (ref 1.8–7.4)
NEUTROPHILS NFR BLD AUTO: 48.2 % — SIGNIFICANT CHANGE UP (ref 43–77)
PHOSPHATE SERPL-MCNC: 1.7 MG/DL — LOW (ref 2.4–4.7)
PLAT MORPH BLD: NORMAL — SIGNIFICANT CHANGE UP
PLATELET # BLD AUTO: 259 K/UL — SIGNIFICANT CHANGE UP (ref 150–400)
POLYCHROMASIA BLD QL SMEAR: SLIGHT — SIGNIFICANT CHANGE UP
POTASSIUM SERPL-MCNC: 2.1 MMOL/L — CRITICAL LOW (ref 3.5–5.3)
POTASSIUM SERPL-SCNC: 2.1 MMOL/L — CRITICAL LOW (ref 3.5–5.3)
PROT SERPL-MCNC: 7.2 G/DL — SIGNIFICANT CHANGE UP (ref 6.6–8.7)
RBC # BLD: 4.45 M/UL — SIGNIFICANT CHANGE UP (ref 3.8–5.2)
RBC # FLD: 21 % — HIGH (ref 10.3–14.5)
RBC BLD AUTO: ABNORMAL
SMUDGE CELLS # BLD: PRESENT — SIGNIFICANT CHANGE UP
SODIUM SERPL-SCNC: 138 MMOL/L — SIGNIFICANT CHANGE UP (ref 135–145)
VARIANT LYMPHS # BLD: 1.8 % — SIGNIFICANT CHANGE UP (ref 0–6)
WBC # BLD: 5.87 K/UL — SIGNIFICANT CHANGE UP (ref 3.8–10.5)
WBC # FLD AUTO: 5.87 K/UL — SIGNIFICANT CHANGE UP (ref 3.8–10.5)

## 2020-09-30 PROCEDURE — 99285 EMERGENCY DEPT VISIT HI MDM: CPT | Mod: CS

## 2020-09-30 PROCEDURE — 99223 1ST HOSP IP/OBS HIGH 75: CPT

## 2020-09-30 PROCEDURE — 93010 ELECTROCARDIOGRAM REPORT: CPT

## 2020-09-30 RX ORDER — INFLUENZA VIRUS VACCINE 15; 15; 15; 15 UG/.5ML; UG/.5ML; UG/.5ML; UG/.5ML
0.5 SUSPENSION INTRAMUSCULAR ONCE
Refills: 0 | Status: COMPLETED | OUTPATIENT
Start: 2020-09-30 | End: 2020-09-30

## 2020-09-30 RX ORDER — MAGNESIUM SULFATE 500 MG/ML
2 VIAL (ML) INJECTION ONCE
Refills: 0 | Status: COMPLETED | OUTPATIENT
Start: 2020-09-30 | End: 2020-09-30

## 2020-09-30 RX ORDER — ENOXAPARIN SODIUM 100 MG/ML
40 INJECTION SUBCUTANEOUS DAILY
Refills: 0 | Status: DISCONTINUED | OUTPATIENT
Start: 2020-09-30 | End: 2020-10-03

## 2020-09-30 RX ORDER — POTASSIUM CHLORIDE 20 MEQ
40 PACKET (EA) ORAL EVERY 4 HOURS
Refills: 0 | Status: COMPLETED | OUTPATIENT
Start: 2020-09-30 | End: 2020-10-01

## 2020-09-30 RX ORDER — POTASSIUM CHLORIDE 20 MEQ
10 PACKET (EA) ORAL
Refills: 0 | Status: COMPLETED | OUTPATIENT
Start: 2020-09-30 | End: 2020-09-30

## 2020-09-30 RX ADMIN — Medication 50 GRAM(S): at 22:06

## 2020-09-30 RX ADMIN — Medication 100 MILLIEQUIVALENT(S): at 22:52

## 2020-09-30 RX ADMIN — Medication 100 MILLIEQUIVALENT(S): at 21:49

## 2020-09-30 RX ADMIN — Medication 40 MILLIEQUIVALENT(S): at 21:50

## 2020-09-30 NOTE — ED ADULT NURSE NOTE - CHPI ED NUR SYMPTOMS NEG
no decreased eating/drinking/no headache/no loss of consciousness/no fever/no pain/no vomiting/no back pain/no chills/no nausea/no dizziness

## 2020-09-30 NOTE — ED PROVIDER NOTE - PROGRESS NOTE DETAILS
Patient profoundly hypokalemic, U waves present on EKG, will require admission for aggressive K repletion. Princess Mcgovern DO

## 2020-09-30 NOTE — H&P ADULT - ASSESSMENT
61 y/o female with PMHx Anemia receives transfusions, Myasthenia gravis on IVIG every 4 weeks, Osteoporosis on Forteo, and Trigeminal neuroglia presented to ED c/o abnormal lab result.    Hypokalemia  -likely due to poor nutritional intake   -K 2.1, s/p 3k riders and 40mg kchol   -check repeat BMP, replete as needed     Hypomagnesemia      -mg 1.7 s/p 2g mg   -check repeat mg     Hypophos  -phos 1.7 given kphos IVPB  -repeat labs     Myasthenia gravis   -on IVIG every 4 weeks  -pyridostigmine 60mg TID     Trigeminal neuroglia  -cont oxcarbazepine     Anxiety  -cont with xanax prn     DVT ppx   -lovenox subq

## 2020-09-30 NOTE — ED PROVIDER NOTE - PHYSICAL EXAMINATION
Gen: NAD, AOx3  Head: NCAT  HEENT: PERRL, oral mucosa moist, normal conjunctiva, oropharynx clear without exudate or erythema  Lung: CTAB, no respiratory distress, no wheezing, rales, rhonchi  CV: normal s1/s2, rrr, no murmurs, Normal perfusion, pulses 2+ throughout  Abd: soft, NTND, no CVA tenderness  MSK: No edema, no visible deformities, full range of motion in all 4 extremities  Neuro: No focal neurologic deficits  Skin: No rash   Psych: normal affect Gen: NAD, AOx3  Head: NCAT  HEENT: oral mucosa moist, normal conjunctiva  Lung: CTAB, no respiratory distress, no wheezing, rales, rhonchi  CV: normal s1/s2, rrr, no murmurs, Normal perfusion, pulses 2+ throughout  Abd: soft, NTND  MSK: No edema, no visible deformities, full range of motion in all 4 extremities  Neuro: No focal neurologic deficits  Skin: No rash   Psych: normal affect

## 2020-09-30 NOTE — H&P ADULT - HISTORY OF PRESENT ILLNESS
59 y/o female with PMHx Anemia receives transfusions, Myasthenia gravis s/p IVIG 2 weeks ago, Osteoporosis, and Trigeminal neuroglia presented to ED c/o abnormal lab result. Patient reports her PMD told her to come to the ED for a low potassium of 2.2, labs taken yesterday. Patient reports tingling in her feet however no other symptoms. 61 y/o female with PMHx Anemia receives transfusions, Myasthenia gravis on IVIG every 4 weeks, Osteoporosis on Forteo, and Trigeminal neuroglia presented to ED c/o abnormal lab result. Patient reports her PMD told her to come to the ED for a low potassium of 2.2, labs taken yesterday. Patient reports tingling in her feet but denies, cp, sob, palpitations, weakness, n/v, diarrhea, denies diuretic use.

## 2020-09-30 NOTE — ED ADULT NURSE NOTE - OBJECTIVE STATEMENT
pt arrived stating her PCP sent her here for K of 2.2. pt denies complaints. denies chest pain, sob, dizziness, nausea, vomiting, weakness. pt states she feels fine. pt aao x4. respirations even and unlabored. skin color wnl warm and dry. NSR on cardiac monitor.

## 2020-09-30 NOTE — ED PROVIDER NOTE - OBJECTIVE STATEMENT
59 y/o female with PMHx of Low iron receives transfusions, Myasthenia gravis, Osteoporosis, and Trigeminal nerve disease Left side jaw presents to ED c/o abnormal lab result. Patient reports her PMD told her to come to the ED for a low potassium of 2.2. Patient reports tingling in her feet    Denies recent diet changes, CP, palpitations, N/V/D, allergies 59 y/o female with PMHx of Low iron receives transfusions, Myasthenia gravis s/p IVIG 2 weeks ago, Osteoporosis, and Trigeminal nerve disease Left side jaw presents to ED c/o abnormal lab result. Patient reports her PMD told her to come to the ED for a low potassium of 2.2, labs taken yesterday. Patient reports tingling in her feet however no other symptoms.     Denies recent diet changes, CP, palpitations, N/V/D, allergies

## 2020-09-30 NOTE — ED PROVIDER NOTE - CLINICAL SUMMARY MEDICAL DECISION MAKING FREE TEXT BOX
Patient with low potassium on outpatient labs, asymptomatic at this time. Will obtain EKG, repeat labs, and re-assess.

## 2020-09-30 NOTE — ED ADULT NURSE NOTE - ED STAT RN HANDOFF DETAILS
report given to SLADE HADDAD in CDU. pt aao x4. respirations even and unlabored. denies pain, sob, dizziness. no complaints. pt transferred to CDU holding room in no acute distress

## 2020-09-30 NOTE — ED ADULT TRIAGE NOTE - CHIEF COMPLAINT QUOTE
Patient alert and oriented x3, per Primary care MD states her potassium is 2.2. no chest pain or sob. denies fevers or chills. denies travel. hx Myastina Gravis.

## 2020-09-30 NOTE — H&P ADULT - NSICDXPASTMEDICALHX_GEN_ALL_CORE_FT
PAST MEDICAL HISTORY:  Myasthenia gravis     Osteoporosis     Trigeminal nerve disease Left side jaw

## 2020-09-30 NOTE — H&P ADULT - NSHPPHYSICALEXAM_GEN_ALL_CORE
T(C): 36.6 (09-30-20 @ 23:30), Max: 37.2 (09-30-20 @ 19:21)  HR: 70 (09-30-20 @ 23:30) (70 - 92)  BP: 109/72 (09-30-20 @ 23:30) (109/64 - 123/83)  RR: 17 (09-30-20 @ 23:30) (16 - 18)  SpO2: 100% (09-30-20 @ 23:30) (98% - 100%)    GENERAL: patient appears well, no acute distress, appropriate, pleasant  EYES: sclera clear, no exudates  ENMT: oropharynx clear without erythema, no exudates, moist mucous membranes  NECK: supple, soft, no thyromegaly noted  LUNGS: good air entry bilaterally, clear to auscultation, symmetric breath sounds, no wheezing or rhonchi appreciated  HEART: soft S1/S2, regular rate and rhythm, no murmurs noted, no lower extremity edema  GASTROINTESTINAL: abdomen is soft, nontender, nondistended, normoactive bowel sounds, no palpable masses  INTEGUMENT: good skin turgor, warm skin, appears well perfused  MUSCULOSKELETAL: no clubbing or cyanosis, no obvious deformity  NEUROLOGIC: awake, alert, oriented x3, good muscle tone in 4 extremities, no obvious sensory deficits  PSYCHIATRIC: mood is good, affect is congruent, linear and logical thought process  HEME/LYMPH: no palpable supraclavicular nodules, no obvious ecchymosis or petechiae

## 2020-10-01 ENCOUNTER — APPOINTMENT (OUTPATIENT)
Dept: RHEUMATOLOGY | Facility: CLINIC | Age: 60
End: 2020-10-01

## 2020-10-01 LAB
ANION GAP SERPL CALC-SCNC: 11 MMOL/L — SIGNIFICANT CHANGE UP (ref 5–17)
BUN SERPL-MCNC: 5 MG/DL — LOW (ref 8–20)
CALCIUM SERPL-MCNC: 7.7 MG/DL — LOW (ref 8.6–10.2)
CHLORIDE SERPL-SCNC: 107 MMOL/L — SIGNIFICANT CHANGE UP (ref 98–107)
CO2 SERPL-SCNC: 25 MMOL/L — SIGNIFICANT CHANGE UP (ref 22–29)
CREAT SERPL-MCNC: 0.77 MG/DL — SIGNIFICANT CHANGE UP (ref 0.5–1.3)
GLUCOSE SERPL-MCNC: 80 MG/DL — SIGNIFICANT CHANGE UP (ref 70–99)
HCT VFR BLD CALC: 32.1 % — LOW (ref 34.5–45)
HCV AB S/CO SERPL IA: 0.13 S/CO — SIGNIFICANT CHANGE UP (ref 0–0.99)
HCV AB SERPL-IMP: SIGNIFICANT CHANGE UP
HGB BLD-MCNC: 10.9 G/DL — LOW (ref 11.5–15.5)
MAGNESIUM SERPL-MCNC: 2.1 MG/DL — SIGNIFICANT CHANGE UP (ref 1.6–2.6)
MCHC RBC-ENTMCNC: 27.5 PG — SIGNIFICANT CHANGE UP (ref 27–34)
MCHC RBC-ENTMCNC: 34 GM/DL — SIGNIFICANT CHANGE UP (ref 32–36)
MCV RBC AUTO: 80.9 FL — SIGNIFICANT CHANGE UP (ref 80–100)
OSMOLALITY UR: 243 MOSM/KG — LOW (ref 300–1000)
PHOSPHATE SERPL-MCNC: 3.9 MG/DL — SIGNIFICANT CHANGE UP (ref 2.4–4.7)
PLATELET # BLD AUTO: 244 K/UL — SIGNIFICANT CHANGE UP (ref 150–400)
POTASSIUM SERPL-MCNC: 2.7 MMOL/L — CRITICAL LOW (ref 3.5–5.3)
POTASSIUM SERPL-SCNC: 2.7 MMOL/L — CRITICAL LOW (ref 3.5–5.3)
POTASSIUM UR-SCNC: 7 MMOL/L — SIGNIFICANT CHANGE UP
RBC # BLD: 3.97 M/UL — SIGNIFICANT CHANGE UP (ref 3.8–5.2)
RBC # FLD: 21.4 % — HIGH (ref 10.3–14.5)
SARS-COV-2 RNA SPEC QL NAA+PROBE: SIGNIFICANT CHANGE UP
SODIUM SERPL-SCNC: 143 MMOL/L — SIGNIFICANT CHANGE UP (ref 135–145)
SODIUM UR-SCNC: <30 MMOL/L — SIGNIFICANT CHANGE UP
WBC # BLD: 3.59 K/UL — LOW (ref 3.8–10.5)
WBC # FLD AUTO: 3.59 K/UL — LOW (ref 3.8–10.5)

## 2020-10-01 PROCEDURE — 99232 SBSQ HOSP IP/OBS MODERATE 35: CPT

## 2020-10-01 RX ORDER — OXCARBAZEPINE 300 MG/1
3 TABLET, FILM COATED ORAL
Qty: 0 | Refills: 0 | DISCHARGE

## 2020-10-01 RX ORDER — PYRIDOSTIGMINE BROMIDE 60 MG/5ML
1 SOLUTION ORAL
Qty: 0 | Refills: 0 | DISCHARGE

## 2020-10-01 RX ORDER — OXCARBAZEPINE 300 MG/1
1 TABLET, FILM COATED ORAL
Qty: 0 | Refills: 0 | DISCHARGE

## 2020-10-01 RX ORDER — ALPRAZOLAM 0.25 MG
1 TABLET ORAL
Qty: 0 | Refills: 0 | DISCHARGE

## 2020-10-01 RX ORDER — TERIPARATIDE 250 UG/ML
20 INJECTION, SOLUTION SUBCUTANEOUS
Qty: 0 | Refills: 0 | DISCHARGE

## 2020-10-01 RX ORDER — OXCARBAZEPINE 300 MG/1
450 TABLET, FILM COATED ORAL
Refills: 0 | Status: DISCONTINUED | OUTPATIENT
Start: 2020-10-01 | End: 2020-10-03

## 2020-10-01 RX ORDER — PYRIDOSTIGMINE BROMIDE 60 MG/5ML
60 SOLUTION ORAL THREE TIMES A DAY
Refills: 0 | Status: DISCONTINUED | OUTPATIENT
Start: 2020-10-01 | End: 2020-10-03

## 2020-10-01 RX ORDER — POTASSIUM CHLORIDE 20 MEQ
10 PACKET (EA) ORAL
Refills: 0 | Status: COMPLETED | OUTPATIENT
Start: 2020-10-01 | End: 2020-10-01

## 2020-10-01 RX ORDER — ACETAMINOPHEN 500 MG
650 TABLET ORAL EVERY 6 HOURS
Refills: 0 | Status: DISCONTINUED | OUTPATIENT
Start: 2020-10-01 | End: 2020-10-03

## 2020-10-01 RX ORDER — ALPRAZOLAM 0.25 MG
0.25 TABLET ORAL THREE TIMES A DAY
Refills: 0 | Status: DISCONTINUED | OUTPATIENT
Start: 2020-10-01 | End: 2020-10-03

## 2020-10-01 RX ORDER — POTASSIUM CHLORIDE 20 MEQ
40 PACKET (EA) ORAL EVERY 4 HOURS
Refills: 0 | Status: COMPLETED | OUTPATIENT
Start: 2020-10-01 | End: 2020-10-01

## 2020-10-01 RX ORDER — POTASSIUM PHOSPHATE, MONOBASIC POTASSIUM PHOSPHATE, DIBASIC 236; 224 MG/ML; MG/ML
30 INJECTION, SOLUTION INTRAVENOUS ONCE
Refills: 0 | Status: COMPLETED | OUTPATIENT
Start: 2020-10-01 | End: 2020-10-01

## 2020-10-01 RX ORDER — GABAPENTIN 400 MG/1
300 CAPSULE ORAL
Qty: 0 | Refills: 0 | DISCHARGE

## 2020-10-01 RX ADMIN — Medication 650 MILLIGRAM(S): at 14:11

## 2020-10-01 RX ADMIN — PYRIDOSTIGMINE BROMIDE 60 MILLIGRAM(S): 60 SOLUTION ORAL at 21:21

## 2020-10-01 RX ADMIN — Medication 40 MILLIEQUIVALENT(S): at 01:43

## 2020-10-01 RX ADMIN — Medication 100 MILLIEQUIVALENT(S): at 12:19

## 2020-10-01 RX ADMIN — Medication 650 MILLIGRAM(S): at 13:10

## 2020-10-01 RX ADMIN — OXCARBAZEPINE 450 MILLIGRAM(S): 300 TABLET, FILM COATED ORAL at 17:13

## 2020-10-01 RX ADMIN — Medication 100 MILLIEQUIVALENT(S): at 13:10

## 2020-10-01 RX ADMIN — POTASSIUM PHOSPHATE, MONOBASIC POTASSIUM PHOSPHATE, DIBASIC 83.33 MILLIMOLE(S): 236; 224 INJECTION, SOLUTION INTRAVENOUS at 01:43

## 2020-10-01 RX ADMIN — Medication 40 MILLIEQUIVALENT(S): at 17:13

## 2020-10-01 RX ADMIN — Medication 100 MILLIEQUIVALENT(S): at 00:00

## 2020-10-01 RX ADMIN — PYRIDOSTIGMINE BROMIDE 60 MILLIGRAM(S): 60 SOLUTION ORAL at 05:09

## 2020-10-01 RX ADMIN — Medication 40 MILLIEQUIVALENT(S): at 21:22

## 2020-10-01 RX ADMIN — Medication 100 MILLIEQUIVALENT(S): at 11:23

## 2020-10-01 RX ADMIN — OXCARBAZEPINE 450 MILLIGRAM(S): 300 TABLET, FILM COATED ORAL at 05:08

## 2020-10-01 RX ADMIN — PYRIDOSTIGMINE BROMIDE 60 MILLIGRAM(S): 60 SOLUTION ORAL at 12:19

## 2020-10-01 RX ADMIN — ENOXAPARIN SODIUM 40 MILLIGRAM(S): 100 INJECTION SUBCUTANEOUS at 12:19

## 2020-10-01 NOTE — PROGRESS NOTE ADULT - ASSESSMENT
61 y/o female with PMHx Anemia receives transfusions, Myasthenia gravis on IVIG every 4 weeks, Osteoporosis on Forteo, and Trigeminal neuroglia presented to ED c/o abnormal lab result.    1. Hypokalemia no clear cause   Serum K still low, despite receiving potassium   Send UA, urine lytes and serum cortisol        2.Hypomagnesemia, improved. Will give one more dose today       3. Hypophosphatemia   -phos 1.7 given kphos IVPB  -repeat labs   4. Myasthenia gravis   -on IVIG every 4 weeks  -pyridostigmine 60mg TID     5. Trigeminal neuroglia  -cont oxcarbazepine     6. Anxiety  -cont with xanax prn     DVT ppx   -lovenox subq

## 2020-10-01 NOTE — PROGRESS NOTE ADULT - SUBJECTIVE AND OBJECTIVE BOX
Patient is a 60y old  Female who presents with a chief complaint of electrolytes derangement (30 Sep 2020 22:46)      INTERVAL HPI/OVERNIGHT EVENTS: seen and examined. No complains. Repeat serum K is till low. 2.7    MEDICATIONS  (STANDING):  enoxaparin Injectable 40 milliGRAM(s) SubCutaneous daily  influenza   Vaccine 0.5 milliLiter(s) IntraMuscular once  OXcarbazepine 450 milliGRAM(s) Oral two times a day  pyridostigmine 60 milliGRAM(s) Oral three times a day    MEDICATIONS  (PRN):  acetaminophen   Tablet .. 650 milliGRAM(s) Oral every 6 hours PRN Mild Pain (1 - 3)  ALPRAZolam 0.25 milliGRAM(s) Oral three times a day PRN anxiety      Allergies    No Known Allergies    Intolerances        REVIEW OF SYSTEMS:  CONSTITUTIONAL: No fever, weight loss, or fatigue  RESPIRATORY: No cough, wheezing, chills or hemoptysis; No shortness of breath  CARDIOVASCULAR: No chest pain, palpitations, dizziness, or leg swelling  GASTROINTESTINAL: No abdominal or epigastric pain. No nausea, vomiting, or hematemesis; No diarrhea or constipation. No melena or hematochezia.  NEUROLOGICAL: No headaches, memory loss, loss of strength, numbness, or tremors  MUSCULOSKELETAL: No joint pain or swelling; No muscle, back, or extremity pain      Vital Signs Last 24 Hrs  T(C): 36.5 (01 Oct 2020 11:16), Max: 37.2 (30 Sep 2020 19:21)  T(F): 97.7 (01 Oct 2020 11:16), Max: 98.9 (30 Sep 2020 19:21)  HR: 65 (01 Oct 2020 11:16) (59 - 92)  BP: 101/69 (01 Oct 2020 11:16) (101/69 - 123/83)  BP(mean): --  RR: 16 (01 Oct 2020 11:16) (16 - 18)  SpO2: 98% (01 Oct 2020 11:16) (97% - 100%)    PHYSICAL EXAM:  GENERAL: thin lady, sitting and eating, NAD  HEAD:  Atraumatic, Normocephalic  EYES: EOMI, PERRLA, conjunctiva and sclera clear  NECK: Supple, No JVD, Normal thyroid  NERVOUS SYSTEM:  Alert & Oriented X3, No gross focal deficits  CHEST/LUNG: Clear to percussion bilaterally; No rales, rhonchi, wheezing, or rubs  HEART: Regular rate and rhythm; No murmurs, rubs, or gallops  ABDOMEN: Soft, Nontender, Nondistended; Bowel sounds present  EXTREMITIES:  No clubbing, cyanosis, or edema  SKIN: No rashes or lesions    LABS:                        10.9   3.59  )-----------( 244      ( 01 Oct 2020 08:01 )             32.1     10-01    143  |  107  |  5.0<L>  ----------------------------<  80  2.7<LL>   |  25.0  |  0.77    Ca    7.7<L>      01 Oct 2020 08:01  Phos  3.9     10-01  Mg     2.1     10-01    TPro  7.2  /  Alb  3.4  /  TBili  <0.2<L>  /  DBili  x   /  AST  23  /  ALT  11  /  AlkPhos  96  09-30        CAPILLARY BLOOD GLUCOSE          RADIOLOGY & ADDITIONAL TESTS:    Imaging Personally Reviewed:  [ ] YES  [ ] NO    Consultant(s) Notes Reviewed:  [ ] YES  [ ] NO    Care Discussed with Consultants/Other Providers [ ] YES  [ ] NO    Plan of Care discussed with Housestaff [ ]YES [ ] NO

## 2020-10-02 LAB
ANION GAP SERPL CALC-SCNC: 9 MMOL/L — SIGNIFICANT CHANGE UP (ref 5–17)
BUN SERPL-MCNC: 5 MG/DL — LOW (ref 8–20)
CALCIUM SERPL-MCNC: 7.8 MG/DL — LOW (ref 8.6–10.2)
CHLORIDE SERPL-SCNC: 107 MMOL/L — SIGNIFICANT CHANGE UP (ref 98–107)
CO2 SERPL-SCNC: 25 MMOL/L — SIGNIFICANT CHANGE UP (ref 22–29)
CORTIS AM PEAK SERPL-MCNC: 10 UG/DL — SIGNIFICANT CHANGE UP (ref 6–18.4)
CREAT SERPL-MCNC: 0.63 MG/DL — SIGNIFICANT CHANGE UP (ref 0.5–1.3)
GLUCOSE SERPL-MCNC: 83 MG/DL — SIGNIFICANT CHANGE UP (ref 70–99)
MAGNESIUM SERPL-MCNC: 1.7 MG/DL — SIGNIFICANT CHANGE UP (ref 1.6–2.6)
MAGNESIUM UR-MCNC: 2.7 MG/DL — SIGNIFICANT CHANGE UP
PHOSPHATE 24H UR-MCNC: 25.8 MG/DL — SIGNIFICANT CHANGE UP
POTASSIUM SERPL-MCNC: 3 MMOL/L — LOW (ref 3.5–5.3)
POTASSIUM SERPL-SCNC: 3 MMOL/L — LOW (ref 3.5–5.3)
SARS-COV-2 IGG SERPL QL IA: NEGATIVE — SIGNIFICANT CHANGE UP
SARS-COV-2 IGM SERPL IA-ACNC: <0.1 INDEX — SIGNIFICANT CHANGE UP
SODIUM SERPL-SCNC: 141 MMOL/L — SIGNIFICANT CHANGE UP (ref 135–145)

## 2020-10-02 PROCEDURE — 99232 SBSQ HOSP IP/OBS MODERATE 35: CPT

## 2020-10-02 RX ORDER — POTASSIUM CHLORIDE 20 MEQ
40 PACKET (EA) ORAL EVERY 4 HOURS
Refills: 0 | Status: COMPLETED | OUTPATIENT
Start: 2020-10-02 | End: 2020-10-02

## 2020-10-02 RX ADMIN — ENOXAPARIN SODIUM 40 MILLIGRAM(S): 100 INJECTION SUBCUTANEOUS at 11:57

## 2020-10-02 RX ADMIN — OXCARBAZEPINE 450 MILLIGRAM(S): 300 TABLET, FILM COATED ORAL at 17:09

## 2020-10-02 RX ADMIN — Medication 40 MILLIEQUIVALENT(S): at 15:25

## 2020-10-02 RX ADMIN — PYRIDOSTIGMINE BROMIDE 60 MILLIGRAM(S): 60 SOLUTION ORAL at 11:58

## 2020-10-02 RX ADMIN — Medication 40 MILLIEQUIVALENT(S): at 11:57

## 2020-10-02 RX ADMIN — PYRIDOSTIGMINE BROMIDE 60 MILLIGRAM(S): 60 SOLUTION ORAL at 21:22

## 2020-10-02 RX ADMIN — PYRIDOSTIGMINE BROMIDE 60 MILLIGRAM(S): 60 SOLUTION ORAL at 05:29

## 2020-10-02 RX ADMIN — Medication 650 MILLIGRAM(S): at 15:25

## 2020-10-02 RX ADMIN — Medication 650 MILLIGRAM(S): at 15:40

## 2020-10-02 RX ADMIN — OXCARBAZEPINE 450 MILLIGRAM(S): 300 TABLET, FILM COATED ORAL at 05:29

## 2020-10-02 NOTE — PROGRESS NOTE ADULT - ASSESSMENT
59 y/o female with PMHx Anemia receives transfusions, Myasthenia gravis on IVIG every 4 weeks, Osteoporosis on Forteo, and Trigeminal neuroglia presented to ED c/o abnormal lab result.    1. Hypokalemia no clear cause , likely secondary to decrease PO intake ????  Urine lytes and serum cortisol all within normal limits   Serum K still low   Give Potassium 40 meq x2, follow BMP after  Discharge when serum K >3.5     2.Hypomagnesemia, improved.       3. Hypophosphatemia   -phos 1.7 given kphos IVPB  -repeat labs   4. Myasthenia gravis   -on IVIG every 4 weeks  -pyridostigmine 60mg TID     5. Trigeminal neuroglia  -cont oxcarbazepine     6. Anxiety  -cont with xanax prn     DVT ppx   -lovenox subq

## 2020-10-02 NOTE — PROGRESS NOTE ADULT - SUBJECTIVE AND OBJECTIVE BOX
Patient is a 60y old  Female who presents with a chief complaint of electrolytes derangement (01 Oct 2020 14:11)      INTERVAL HPI/OVERNIGHT EVENTS: Seen and examined. No complains   Serum K still low. Urine lytes and serum cortisol all within normal limits     MEDICATIONS  (STANDING):  enoxaparin Injectable 40 milliGRAM(s) SubCutaneous daily  influenza   Vaccine 0.5 milliLiter(s) IntraMuscular once  OXcarbazepine 450 milliGRAM(s) Oral two times a day  potassium chloride    Tablet ER 40 milliEquivalent(s) Oral every 4 hours  pyridostigmine 60 milliGRAM(s) Oral three times a day    MEDICATIONS  (PRN):  acetaminophen   Tablet .. 650 milliGRAM(s) Oral every 6 hours PRN Mild Pain (1 - 3)  ALPRAZolam 0.25 milliGRAM(s) Oral three times a day PRN anxiety      Allergies    No Known Allergies    Intolerances        REVIEW OF SYSTEMS:  CONSTITUTIONAL: No fever, weight loss, or fatigue  RESPIRATORY: No cough, wheezing, chills or hemoptysis; No shortness of breath  CARDIOVASCULAR: No chest pain, palpitations, dizziness, or leg swelling  GASTROINTESTINAL: No abdominal or epigastric pain. No nausea, vomiting, or hematemesis; No diarrhea or constipation. No melena or hematochezia.  NEUROLOGICAL: No headaches, memory loss, loss of strength, numbness, or tremors  MUSCULOSKELETAL: No joint pain or swelling; No muscle, back, or extremity pain      Vital Signs Last 24 Hrs  T(C): 36.8 (02 Oct 2020 07:55), Max: 36.8 (02 Oct 2020 07:55)  T(F): 98.3 (02 Oct 2020 07:55), Max: 98.3 (02 Oct 2020 07:55)  HR: 68 (02 Oct 2020 08:15) (60 - 74)  BP: 112/72 (02 Oct 2020 07:55) (102/66 - 115/72)  BP(mean): --  RR: 18 (02 Oct 2020 07:55) (18 - 20)  SpO2: 98% (02 Oct 2020 08:15) (96% - 99%)    PHYSICAL EXAM:  GENERAL: NAD, well-groomed, well-developed  HEAD:  Atraumatic, Normocephalic  EYES: EOMI, PERRLA, conjunctiva and sclera clear  NECK: Supple, No JVD, Normal thyroid  NERVOUS SYSTEM:  Alert & Oriented X3, No gross focal deficits  CHEST/LUNG: Clear to percussion bilaterally; No rales, rhonchi, wheezing, or rubs  HEART: Regular rate and rhythm; No murmurs, rubs, or gallops  ABDOMEN: Soft, Nontender, Nondistended; Bowel sounds present  EXTREMITIES:  No clubbing, cyanosis, or edema  SKIN: No rashes or lesions    LABS:                        10.9   3.59  )-----------( 244      ( 01 Oct 2020 08:01 )             32.1     10-02    141  |  107  |  5.0<L>  ----------------------------<  83  3.0<L>   |  25.0  |  0.63    Ca    7.8<L>      02 Oct 2020 08:35  Phos  3.9     10-01  Mg     1.7     10-02    TPro  7.2  /  Alb  3.4  /  TBili  <0.2<L>  /  DBili  x   /  AST  23  /  ALT  11  /  AlkPhos  96  09-30        CAPILLARY BLOOD GLUCOSE          RADIOLOGY & ADDITIONAL TESTS:    Imaging Personally Reviewed:  [ ] YES  [ ] NO    Consultant(s) Notes Reviewed:  [ ] YES  [ ] NO    Care Discussed with Consultants/Other Providers [ ] YES  [ ] NO    Plan of Care discussed with Housestaff [ ]YES [ ] NO

## 2020-10-03 ENCOUNTER — TRANSCRIPTION ENCOUNTER (OUTPATIENT)
Age: 60
End: 2020-10-03

## 2020-10-03 VITALS
TEMPERATURE: 97 F | DIASTOLIC BLOOD PRESSURE: 74 MMHG | OXYGEN SATURATION: 98 % | HEART RATE: 63 BPM | SYSTOLIC BLOOD PRESSURE: 108 MMHG | RESPIRATION RATE: 17 BRPM

## 2020-10-03 LAB
ANION GAP SERPL CALC-SCNC: 8 MMOL/L — SIGNIFICANT CHANGE UP (ref 5–17)
BUN SERPL-MCNC: 6 MG/DL — LOW (ref 8–20)
CALCIUM SERPL-MCNC: 7.9 MG/DL — LOW (ref 8.6–10.2)
CHLORIDE SERPL-SCNC: 106 MMOL/L — SIGNIFICANT CHANGE UP (ref 98–107)
CO2 SERPL-SCNC: 27 MMOL/L — SIGNIFICANT CHANGE UP (ref 22–29)
CREAT SERPL-MCNC: 0.74 MG/DL — SIGNIFICANT CHANGE UP (ref 0.5–1.3)
GLUCOSE SERPL-MCNC: 73 MG/DL — SIGNIFICANT CHANGE UP (ref 70–99)
MAGNESIUM SERPL-MCNC: 1.6 MG/DL — SIGNIFICANT CHANGE UP (ref 1.6–2.6)
POTASSIUM SERPL-MCNC: 3.6 MMOL/L — SIGNIFICANT CHANGE UP (ref 3.5–5.3)
POTASSIUM SERPL-SCNC: 3.6 MMOL/L — SIGNIFICANT CHANGE UP (ref 3.5–5.3)
SODIUM SERPL-SCNC: 141 MMOL/L — SIGNIFICANT CHANGE UP (ref 135–145)

## 2020-10-03 PROCEDURE — 99238 HOSP IP/OBS DSCHRG MGMT 30/<: CPT

## 2020-10-03 RX ORDER — MAGNESIUM SULFATE 500 MG/ML
2 VIAL (ML) INJECTION ONCE
Refills: 0 | Status: COMPLETED | OUTPATIENT
Start: 2020-10-03 | End: 2020-10-03

## 2020-10-03 RX ORDER — POTASSIUM CHLORIDE 20 MEQ
40 PACKET (EA) ORAL ONCE
Refills: 0 | Status: COMPLETED | OUTPATIENT
Start: 2020-10-03 | End: 2020-10-03

## 2020-10-03 RX ADMIN — Medication 50 GRAM(S): at 09:29

## 2020-10-03 RX ADMIN — OXCARBAZEPINE 450 MILLIGRAM(S): 300 TABLET, FILM COATED ORAL at 05:37

## 2020-10-03 RX ADMIN — Medication 40 MILLIEQUIVALENT(S): at 09:29

## 2020-10-03 RX ADMIN — PYRIDOSTIGMINE BROMIDE 60 MILLIGRAM(S): 60 SOLUTION ORAL at 05:37

## 2020-10-03 NOTE — DISCHARGE NOTE PROVIDER - NSDCCPCAREPLAN_GEN_ALL_CORE_FT
PRINCIPAL DISCHARGE DIAGNOSIS  Diagnosis: Hypokalemia  Assessment and Plan of Treatment: resolved  please follow up with primary care doctor and Dr Snyder (nephrology) as needed       PRINCIPAL DISCHARGE DIAGNOSIS  Diagnosis: Hypokalemia  Assessment and Plan of Treatment: resolved, likely nutritional.   please follow up with primary care doctor and Dr Snyder (nephrology) as needed

## 2020-10-03 NOTE — DISCHARGE NOTE PROVIDER - NSDCMRMEDTOKEN_GEN_ALL_CORE_FT
Forteo: 20 microgram(s) subcutaneous once a day  OXcarbazepine 150 mg oral tablet: 3 tab(s) orally 2 times a day  pyridostigmine 60 mg oral tablet: 1 tab(s) orally 3 times a day  Xanax 0.25 mg oral tablet: 1 tab(s) orally 3 times a day, As Needed

## 2020-10-03 NOTE — DISCHARGE NOTE PROVIDER - HOSPITAL COURSE
59 y/o female with PMHx Anemia receives transfusions, Myasthenia gravis on IVIG every 4 weeks, Osteoporosis on Forteo, and Trigeminal neuroglia presented to ED after noted to be hypokalemic on outpatient labs  Cortisol wnl. repleted aggressively.    dc home with outpatient follow up.    59 y/o female with PMHx Anemia receives transfusions, Myasthenia gravis on IVIG every 4 weeks, Osteoporosis on Forteo, and Trigeminal neuroglia presented to ED after noted to be hypokalemic on outpatient labs  Cortisol wnl. repleted aggressively.  likely nutritional in nature.  dc home with outpatient follow up.

## 2020-10-03 NOTE — DISCHARGE NOTE NURSING/CASE MANAGEMENT/SOCIAL WORK - PATIENT PORTAL LINK FT
You can access the FollowMyHealth Patient Portal offered by [facility] by registering at the following website: [email or web address]. By joining [employer]’s FollowMyHealth portal, you will also be able to view your health information using other applications (apps) compatible with our system.

## 2020-10-08 ENCOUNTER — APPOINTMENT (OUTPATIENT)
Dept: RHEUMATOLOGY | Facility: CLINIC | Age: 60
End: 2020-10-08
Payer: MEDICARE

## 2020-10-08 ENCOUNTER — LABORATORY RESULT (OUTPATIENT)
Age: 60
End: 2020-10-08

## 2020-10-08 VITALS
WEIGHT: 115 LBS | HEART RATE: 73 BPM | DIASTOLIC BLOOD PRESSURE: 72 MMHG | BODY MASS INDEX: 19.63 KG/M2 | TEMPERATURE: 98.5 F | OXYGEN SATURATION: 98 % | RESPIRATION RATE: 17 BRPM | HEIGHT: 64 IN | SYSTOLIC BLOOD PRESSURE: 104 MMHG

## 2020-10-08 PROCEDURE — 36415 COLL VENOUS BLD VENIPUNCTURE: CPT

## 2020-10-08 PROCEDURE — 99215 OFFICE O/P EST HI 40 MIN: CPT | Mod: 25

## 2020-10-08 RX ORDER — DICLOFENAC SODIUM 75 MG/1
75 TABLET, DELAYED RELEASE ORAL
Refills: 0 | Status: DISCONTINUED | COMMUNITY
End: 2020-10-08

## 2020-10-08 NOTE — HISTORY OF PRESENT ILLNESS
[FreeTextEntry1] : Pt reports that she was hospitalized last week for hypokalemia.  Otherwise, feeling fine overall since last visit.   Right wrist pain virtually resolved.  Pt received a C-S injection by ortho about 3.5 months ago.  B/L knees and B/L hips all currently much improved.  No current complaints. [Anorexia] : no anorexia [Weight Loss] : no weight loss [Malaise] : no malaise [Fever] : no fever [Chills] : no chills [Fatigue] : no fatigue [Malar Facial Rash] : no malar facial rash [Skin Lesions] : no lesions [Skin Nodules] : no skin nodules [Oral Ulcers] : no oral ulcers [Cough] : no cough

## 2020-10-08 NOTE — ASSESSMENT
[FreeTextEntry1] : 58 year old female with polyarticular joint pains which appear to be multifactorial:\par 1)  Recent episode of acute on chronic pain of the right wrist s/p:  Acute symptoms most c/w inflammatory arthritis - most likely CPPD/pseudogout, given chondrocalcinosis on x-rays though would need arthrocentesis for definitive diagnosis (no effusion to aspirate at this time).  Etiology of chronic pain unclear, though currently virtually resolved.  MRI reveals tenosynovitis of the extensor carpi ulnaris and a moderate effusion of the radioulnar joint - ?was a remnant of recent flare, as no effusion upon exam at this time.\par   - Cont wrist exercises.\par   - ibuprofen prn.\par   - warm compresses\par   - OTC topical analgesics\par 2)  Pain in B/L hips - due to OA;  also w/ B/L knee pain (L>R) - prior MRI reveals a complex tear of the left medial meniscus\par   - Reiterated importance of exercise\par   - analgesia as above\par   - ortho f/u\par 3)  Right shoulder pain:  most c/w RTC tendinopathy - much improved.\par   - Cont shoulder exercises\par   - analgesia as above\par 4)  Osteoporosis w/ hx of multiple fractures.  Previously didn't tolerate Fosamax and failed Boniva IV.\par   - Cont Forteo (through 8/2021).\par   - calcium/vit D\par   - weight bearing exercise.\par 5)  Hypokalemia: hospitalized last week.\par   - Check K+\par 6)  Vit D deficiency:  now WNL.\par   - Cont vit D supplementation.\par 7)  Right foot pain:  resolved.\par   - Pt to call if symptoms recur.\par 8)  Recent episode of pain/swelling/erythema on right forearm and wrist.  Pt's description more c/w cellulitis than crystal arthritis.\par   - Advised pt to call me immediately if symptoms recur.

## 2020-10-08 NOTE — PHYSICAL EXAM
[General Appearance - Alert] : alert [General Appearance - In No Acute Distress] : in no acute distress [Sclera] : the sclera and conjunctiva were normal [Outer Ear] : the ears and nose were normal in appearance [Oropharynx] : the oropharynx was normal [Neck Appearance] : the appearance of the neck was normal [Neck Cervical Mass (___cm)] : no neck mass was observed [Jugular Venous Distention Increased] : there was no jugular-venous distention [Thyroid Diffuse Enlargement] : the thyroid was not enlarged [Thyroid Nodule] : there were no palpable thyroid nodules [Auscultation Breath Sounds / Voice Sounds] : lungs were clear to auscultation bilaterally [Heart Rate And Rhythm] : heart rate was normal and rhythm regular [Heart Sounds] : normal S1 and S2 [Heart Sounds Gallop] : no gallops [Murmurs] : no murmurs [Heart Sounds Pericardial Friction Rub] : no pericardial rub [Edema] : there was no peripheral edema [Bowel Sounds] : normal bowel sounds [Abdomen Soft] : soft [Abdomen Tenderness] : non-tender [Abdomen Mass (___ Cm)] : no abdominal mass palpated [Cervical Lymph Nodes Enlarged Posterior Bilaterally] : posterior cervical [Cervical Lymph Nodes Enlarged Anterior Bilaterally] : anterior cervical [Supraclavicular Lymph Nodes Enlarged Bilaterally] : supraclavicular [No Spinal Tenderness] : no spinal tenderness [Skin Color & Pigmentation] : normal skin color and pigmentation [Skin Turgor] : normal skin turgor [] : no rash [No Focal Deficits] : no focal deficits [Oriented To Time, Place, And Person] : oriented to person, place, and time [Impaired Insight] : insight and judgment were intact [Affect] : the affect was normal [FreeTextEntry1] : No synovitis;  B/L hips w/ pain upon internal rotation and abduction;  B/L knees w/ normal ROM

## 2020-10-09 LAB
ALBUMIN SERPL ELPH-MCNC: 3.7 G/DL
ALP BLD-CCNC: 106 U/L
ALT SERPL-CCNC: 20 U/L
ANION GAP SERPL CALC-SCNC: 11 MMOL/L
AST SERPL-CCNC: 25 U/L
BASOPHILS # BLD AUTO: 0.05 K/UL
BASOPHILS NFR BLD AUTO: 1 %
BILIRUB SERPL-MCNC: <0.2 MG/DL
BUN SERPL-MCNC: 6 MG/DL
CALCIUM SERPL-MCNC: 8.8 MG/DL
CHLORIDE SERPL-SCNC: 103 MMOL/L
CO2 SERPL-SCNC: 26 MMOL/L
CREAT SERPL-MCNC: 0.96 MG/DL
EOSINOPHIL # BLD AUTO: 0.08 K/UL
EOSINOPHIL NFR BLD AUTO: 1.6 %
GLUCOSE SERPL-MCNC: 107 MG/DL
HCT VFR BLD CALC: 39.5 %
HGB BLD-MCNC: 12.4 G/DL
IMM GRANULOCYTES NFR BLD AUTO: 0.2 %
LYMPHOCYTES # BLD AUTO: 1.95 K/UL
LYMPHOCYTES NFR BLD AUTO: 37.9 %
MAN DIFF?: NORMAL
MCHC RBC-ENTMCNC: 27.6 PG
MCHC RBC-ENTMCNC: 31.4 GM/DL
MCV RBC AUTO: 88 FL
MONOCYTES # BLD AUTO: 0.34 K/UL
MONOCYTES NFR BLD AUTO: 6.6 %
NEUTROPHILS # BLD AUTO: 2.72 K/UL
NEUTROPHILS NFR BLD AUTO: 52.7 %
PLATELET # BLD AUTO: 350 K/UL
POTASSIUM SERPL-SCNC: 3.4 MMOL/L
PROT SERPL-MCNC: 6.9 G/DL
RBC # BLD: 4.49 M/UL
RBC # FLD: 22.3 %
SODIUM SERPL-SCNC: 141 MMOL/L
WBC # FLD AUTO: 5.15 K/UL

## 2020-10-21 PROCEDURE — 84133 ASSAY OF URINE POTASSIUM: CPT

## 2020-10-21 PROCEDURE — 83735 ASSAY OF MAGNESIUM: CPT

## 2020-10-21 PROCEDURE — 80053 COMPREHEN METABOLIC PANEL: CPT

## 2020-10-21 PROCEDURE — 84100 ASSAY OF PHOSPHORUS: CPT

## 2020-10-21 PROCEDURE — 80048 BASIC METABOLIC PNL TOTAL CA: CPT

## 2020-10-21 PROCEDURE — 83935 ASSAY OF URINE OSMOLALITY: CPT

## 2020-10-21 PROCEDURE — 86803 HEPATITIS C AB TEST: CPT

## 2020-10-21 PROCEDURE — 93005 ELECTROCARDIOGRAM TRACING: CPT

## 2020-10-21 PROCEDURE — 85025 COMPLETE CBC W/AUTO DIFF WBC: CPT

## 2020-10-21 PROCEDURE — 86769 SARS-COV-2 COVID-19 ANTIBODY: CPT

## 2020-10-21 PROCEDURE — 99285 EMERGENCY DEPT VISIT HI MDM: CPT | Mod: 25

## 2020-10-21 PROCEDURE — 82533 TOTAL CORTISOL: CPT

## 2020-10-21 PROCEDURE — 84105 ASSAY OF URINE PHOSPHORUS: CPT

## 2020-10-21 PROCEDURE — 84300 ASSAY OF URINE SODIUM: CPT

## 2020-10-21 PROCEDURE — 36415 COLL VENOUS BLD VENIPUNCTURE: CPT

## 2020-10-21 PROCEDURE — U0003: CPT

## 2020-10-21 PROCEDURE — 85027 COMPLETE CBC AUTOMATED: CPT

## 2021-02-04 ENCOUNTER — APPOINTMENT (OUTPATIENT)
Dept: RHEUMATOLOGY | Facility: CLINIC | Age: 61
End: 2021-02-04

## 2021-03-25 ENCOUNTER — TRANSCRIPTION ENCOUNTER (OUTPATIENT)
Age: 61
End: 2021-03-25

## 2021-03-26 ENCOUNTER — APPOINTMENT (OUTPATIENT)
Dept: CT IMAGING | Facility: CLINIC | Age: 61
End: 2021-03-26

## 2021-05-01 ENCOUNTER — TRANSCRIPTION ENCOUNTER (OUTPATIENT)
Age: 61
End: 2021-05-01

## 2021-07-01 ENCOUNTER — APPOINTMENT (OUTPATIENT)
Dept: RHEUMATOLOGY | Facility: CLINIC | Age: 61
End: 2021-07-01
Payer: MEDICARE

## 2021-07-01 VITALS
SYSTOLIC BLOOD PRESSURE: 100 MMHG | RESPIRATION RATE: 17 BRPM | OXYGEN SATURATION: 97 % | TEMPERATURE: 98 F | WEIGHT: 120 LBS | BODY MASS INDEX: 20.49 KG/M2 | HEART RATE: 73 BPM | HEIGHT: 64 IN | DIASTOLIC BLOOD PRESSURE: 66 MMHG

## 2021-07-01 PROCEDURE — 36415 COLL VENOUS BLD VENIPUNCTURE: CPT

## 2021-07-01 PROCEDURE — 99214 OFFICE O/P EST MOD 30 MIN: CPT | Mod: 25

## 2021-07-01 RX ORDER — CEFUROXIME AXETIL 500 MG/1
500 TABLET ORAL
Refills: 0 | Status: DISCONTINUED | COMMUNITY
End: 2021-07-01

## 2021-07-01 RX ORDER — PEN NEEDLE, DIABETIC 29 G X1/2"
32G X 4 MM NEEDLE, DISPOSABLE MISCELLANEOUS
Qty: 100 | Refills: 1 | Status: DISCONTINUED | COMMUNITY
Start: 2019-08-08 | End: 2021-07-01

## 2021-07-01 RX ORDER — TERIPARATIDE 250 UG/ML
600 INJECTION, SOLUTION SUBCUTANEOUS DAILY
Qty: 1 | Refills: 11 | Status: DISCONTINUED | COMMUNITY
Start: 2019-07-26 | End: 2021-07-01

## 2021-07-01 NOTE — HISTORY OF PRESENT ILLNESS
[FreeTextEntry1] : Feeling fine overall since last visit.  She reports that she fell several months ago, and was found to have a fracture on x-ray (though unclear if new vs old).  Right wrist w/ intermittent pain, haider w/ weather changes.  Also w/ intermittent pain in her distal right knee - improves w/ ibuprofen.  She also says that her hips feel stiff w/ walking.  No other new complaints. [Anorexia] : no anorexia [Weight Loss] : no weight loss [Malaise] : no malaise [Fever] : no fever [Chills] : no chills [Fatigue] : no fatigue [Malar Facial Rash] : no malar facial rash [Skin Lesions] : no lesions [Skin Nodules] : no skin nodules [Oral Ulcers] : no oral ulcers [Cough] : no cough

## 2021-07-01 NOTE — DATA REVIEWED
[FreeTextEntry1] : DEXA:  \par T-scores:  spine -3.2;   left FN -2.8;  right FN -2.8;  left TH -2.7;  right TH -2.9\par \par x-rays of T-spine:  Age-indeterminate wedge compression deformity of the upper thoracic spine in the area of T4.\par \par x-rays of left hand:\par 1.  No acute fracture or dislocation.\par 2.  Chondrocalcinosis of the left wrist, similar in appearance to prior study.\par 3.  Scattered osteoarthritic changes.\par

## 2021-07-01 NOTE — ASSESSMENT
[FreeTextEntry1] : 58 year old female with polyarticular joint pains which appear to be multifactorial:\par 1)  Recent episode of acute on chronic pain of the right wrist s/p:  Acute symptoms most c/w inflammatory arthritis - most likely CPPD/pseudogout, given chondrocalcinosis on x-rays though would need arthrocentesis for definitive diagnosis (no effusion to aspirate at this time).  Etiology of chronic pain unclear, though currently virtually resolved.  MRI reveals tenosynovitis of the extensor carpi ulnaris and a moderate effusion of the radioulnar joint - ?was a remnant of recent flare, as no effusion upon exam at this time.\par   - Cont wrist exercises.\par   - ibuprofen prn.\par   - warm compresses\par   - OTC topical analgesics\par 2)  Pain in B/L hips - due to OA;  also w/ B/L knee pain (L>R) - prior MRI reveals a complex tear of the left medial meniscus\par   - Reiterated importance of exercise\par   - analgesia as above\par   - ortho f/u\par 3)  Right shoulder pain:  most c/w RTC tendinopathy - much improved.\par   - Cont shoulder exercises\par   - analgesia as above\par 4)  Osteoporosis w/ hx of multiple fractures.  Previously didn't tolerate Fosamax and failed Boniva IV.  Recent DEXA w improvement in spine though worse in hip and femoral neck\par   - Cont Forteo for 1 more month (total of 2 years), then will start Prolia.\par   - Cont vit D\par   - weight bearing exercise.\par 5)  Recent hypokalemia: \par   - Cont to monitor.\par 6)  Vit D deficiency:  now WNL.\par   - Cont vit D supplementation.\par 7)  Right foot pain:  resolved.\par   - Pt to call if symptoms recur.\par 8)  Recent episode of pain/swelling/erythema on right forearm and wrist.  Pt's description more c/w cellulitis than crystal arthritis.\par   - Previously advised pt to call me immediately if symptoms recur.

## 2021-07-02 ENCOUNTER — NON-APPOINTMENT (OUTPATIENT)
Age: 61
End: 2021-07-02

## 2021-07-02 LAB
25(OH)D3 SERPL-MCNC: 69.8 NG/ML
ALBUMIN SERPL ELPH-MCNC: 4 G/DL
ALP BLD-CCNC: 75 U/L
ALT SERPL-CCNC: 13 U/L
ANION GAP SERPL CALC-SCNC: 11 MMOL/L
AST SERPL-CCNC: 24 U/L
BASOPHILS # BLD AUTO: 0.03 K/UL
BASOPHILS NFR BLD AUTO: 0.5 %
BILIRUB SERPL-MCNC: 0.4 MG/DL
BUN SERPL-MCNC: 13 MG/DL
CALCIUM SERPL-MCNC: 9 MG/DL
CHLORIDE SERPL-SCNC: 99 MMOL/L
CO2 SERPL-SCNC: 22 MMOL/L
CREAT SERPL-MCNC: 1.14 MG/DL
EOSINOPHIL # BLD AUTO: 0.05 K/UL
EOSINOPHIL NFR BLD AUTO: 0.9 %
GLUCOSE SERPL-MCNC: 93 MG/DL
HCT VFR BLD CALC: 36 %
HGB BLD-MCNC: 11.8 G/DL
IMM GRANULOCYTES NFR BLD AUTO: 0.2 %
LYMPHOCYTES # BLD AUTO: 2.28 K/UL
LYMPHOCYTES NFR BLD AUTO: 39.4 %
MAN DIFF?: NORMAL
MCHC RBC-ENTMCNC: 31.1 PG
MCHC RBC-ENTMCNC: 32.8 GM/DL
MCV RBC AUTO: 95 FL
MONOCYTES # BLD AUTO: 0.33 K/UL
MONOCYTES NFR BLD AUTO: 5.7 %
NEUTROPHILS # BLD AUTO: 3.08 K/UL
NEUTROPHILS NFR BLD AUTO: 53.3 %
PLATELET # BLD AUTO: 281 K/UL
POTASSIUM SERPL-SCNC: 4 MMOL/L
PROT SERPL-MCNC: 7.5 G/DL
RBC # BLD: 3.79 M/UL
RBC # FLD: 13.2 %
SODIUM SERPL-SCNC: 133 MMOL/L
WBC # FLD AUTO: 5.78 K/UL

## 2021-07-16 ENCOUNTER — TRANSCRIPTION ENCOUNTER (OUTPATIENT)
Age: 61
End: 2021-07-16

## 2021-08-03 ENCOUNTER — APPOINTMENT (OUTPATIENT)
Dept: RHEUMATOLOGY | Facility: CLINIC | Age: 61
End: 2021-08-03
Payer: MEDICARE

## 2021-08-03 VITALS
TEMPERATURE: 98.2 F | BODY MASS INDEX: 20.49 KG/M2 | HEIGHT: 64 IN | OXYGEN SATURATION: 98 % | SYSTOLIC BLOOD PRESSURE: 100 MMHG | RESPIRATION RATE: 17 BRPM | HEART RATE: 66 BPM | WEIGHT: 120 LBS | DIASTOLIC BLOOD PRESSURE: 64 MMHG

## 2021-08-03 LAB
ANION GAP SERPL CALC-SCNC: 9 MMOL/L
BUN SERPL-MCNC: 8 MG/DL
CALCIUM SERPL-MCNC: 9.3 MG/DL
CHLORIDE SERPL-SCNC: 103 MMOL/L
CO2 SERPL-SCNC: 25 MMOL/L
CREAT SERPL-MCNC: 1.01 MG/DL
GLUCOSE SERPL-MCNC: 79 MG/DL
POTASSIUM SERPL-SCNC: 4.1 MMOL/L
SODIUM SERPL-SCNC: 137 MMOL/L

## 2021-08-03 PROCEDURE — 36415 COLL VENOUS BLD VENIPUNCTURE: CPT

## 2021-08-03 PROCEDURE — 99214 OFFICE O/P EST MOD 30 MIN: CPT | Mod: 25

## 2021-08-03 PROCEDURE — 20610 DRAIN/INJ JOINT/BURSA W/O US: CPT | Mod: LT

## 2021-08-03 PROCEDURE — 96372 THER/PROPH/DIAG INJ SC/IM: CPT | Mod: 59

## 2021-08-03 RX ORDER — METHYLPRED ACET/NACL,ISO-OS/PF 40 MG/ML
40 VIAL (ML) INJECTION
Qty: 1 | Refills: 0 | Status: COMPLETED | OUTPATIENT
Start: 2021-08-03

## 2021-08-03 RX ORDER — DENOSUMAB 60 MG/ML
60 INJECTION SUBCUTANEOUS
Qty: 60 | Refills: 0 | Status: COMPLETED | OUTPATIENT
Start: 2021-08-03

## 2021-08-03 RX ORDER — ALPRAZOLAM 0.25 MG/1
0.25 TABLET ORAL
Refills: 0 | Status: DISCONTINUED | COMMUNITY
End: 2021-08-03

## 2021-08-03 RX ADMIN — METHYLPREDNISOLONE ACETATE MG/ML: 40 INJECTION, SUSPENSION INTRA-ARTICULAR; INTRALESIONAL; INTRAMUSCULAR; SOFT TISSUE at 00:00

## 2021-08-03 RX ADMIN — DENOSUMAB 0 MG/ML: 60 INJECTION SUBCUTANEOUS at 00:00

## 2021-08-03 NOTE — ASSESSMENT
[FreeTextEntry1] : 58 year old female with polyarticular joint pains which appear to be multifactorial:\par 1)  Recent episode of acute on chronic pain of the right wrist s/p:  Acute symptoms most c/w inflammatory arthritis - most likely CPPD/pseudogout, given chondrocalcinosis on x-rays though would need arthrocentesis for definitive diagnosis (no effusion to aspirate at this time).  Etiology of chronic pain unclear, though currently virtually resolved.  MRI reveals tenosynovitis of the extensor carpi ulnaris and a moderate effusion of the radioulnar joint - ?was a remnant of recent flare, as no effusion upon exam at this time.\par   - Cont wrist exercises.\par   - ibuprofen prn.\par   - warm compresses\par   - OTC topical analgesics\par 2)  Pain in B/L hips - due to OA;  also w/ B/L knee pain (L>R) - prior MRI reveals a complex tear of the left medial meniscus\par   - Reiterated importance of exercise\par   - analgesia as above\par   - ortho f/u\par 3)  Right shoulder pain:  most c/w RTC tendinopathy - much improved.\par   - Cont shoulder exercises\par   - analgesia as above\par 4)  Osteoporosis w/ hx of multiple fractures.  Previously didn't tolerate Fosamax and failed Boniva IV.  Recent DEXA w improvement in spine though worse in hip and femoral neck\par   - Administered Prolia to LUE (dose #1).\par   - Cont vit D\par   - weight bearing exercise.\par 5)  Recent hypokalemia: resolved upon repeat\par   - Cont to monitor.\par 6)  Vit D deficiency:  now WNL.\par   - Cont vit D supplementation.\par 7)  Right foot pain:  resolved.\par   - Pt to call if symptoms recur.\par 8)  Recent episode of pain/swelling/erythema on right forearm and wrist.  Pt's description more c/w cellulitis than crystal arthritis.\par   - Previously advised pt to call me immediately if symptoms recur.\par 9)  Hyponatremia:  asymptomatic\par   - Previously recommended decreasing fluid intake slightly.\par   - Repeat BMP.

## 2021-08-03 NOTE — PROCEDURE
[Today's Date:] : Date: [unfilled] [Arthrocentesis] : arthrocentesis was performed [Soft Tissue Injection] : soft tissue injection was performed [Patient] : the patient [Risks] : risks [Benefits] : benefits [Alternatives] : alternatives [Consent Obtained] : written consent was obtained prior to the procedure and is detailed in the patient's record [Diagnostic] : diagnostic  [Therapeutic] : therapeutic [#1 Site: ______] : #1 site identified in the [unfilled] [___ ml Inj] : [unfilled] ~Uml [1%] : 1%  [Without Epi] : without epinephrine [Chlorhexidine] : chlorhexidine [22 gauge 1.5 inch] : A 22 gauge 1.5 inch needle was used [Depomedrol ___ mg] : Depomedrol [unfilled] mg [#2 Site: ___] : # 2 site identified in the [unfilled] [Ethyl Chloride] : ethyl chloride [Alcohol] : alcohol [Tolerated Well] : the patient tolerated the procedure well [No Complications] : there were no complications [Instructions Given] : handouts/patient instructions were given to patient [Patient Instructed to Call] : patient was instructed to call if redness at site, a decrease in range of motion or an increase in pain is noted after procedure. [de-identified] : Prolia 60mg

## 2021-08-03 NOTE — HISTORY OF PRESENT ILLNESS
[FreeTextEntry1] : Pt c/o pain in her lateral left hip, worst w/ lying on her side in bed.  Still w/ intermittent pain in her right wrist, haider w/ cold or humid weather.  Also still w/ intermittent pain in her medial right knee - has been occurring for the past 1-2 days.  \par  [Anorexia] : no anorexia [Weight Loss] : no weight loss [Malaise] : no malaise [Fever] : no fever [Chills] : no chills [Fatigue] : no fatigue [Malar Facial Rash] : no malar facial rash [Skin Lesions] : no lesions [Skin Nodules] : no skin nodules [Oral Ulcers] : no oral ulcers [Cough] : no cough

## 2021-08-04 ENCOUNTER — NON-APPOINTMENT (OUTPATIENT)
Age: 61
End: 2021-08-04

## 2021-10-07 ENCOUNTER — APPOINTMENT (OUTPATIENT)
Dept: ORTHOPEDIC SURGERY | Facility: CLINIC | Age: 61
End: 2021-10-07
Payer: MEDICARE

## 2021-10-07 VITALS
SYSTOLIC BLOOD PRESSURE: 113 MMHG | DIASTOLIC BLOOD PRESSURE: 77 MMHG | HEIGHT: 64.5 IN | BODY MASS INDEX: 20.24 KG/M2 | HEART RATE: 82 BPM | WEIGHT: 120 LBS

## 2021-10-07 DIAGNOSIS — M79.645 PAIN IN LEFT FINGER(S): ICD-10-CM

## 2021-10-07 PROCEDURE — 99214 OFFICE O/P EST MOD 30 MIN: CPT

## 2021-10-07 PROCEDURE — 73140 X-RAY EXAM OF FINGER(S): CPT | Mod: F3

## 2021-10-28 ENCOUNTER — APPOINTMENT (OUTPATIENT)
Dept: RHEUMATOLOGY | Facility: CLINIC | Age: 61
End: 2021-10-28
Payer: MEDICARE

## 2021-10-28 VITALS
SYSTOLIC BLOOD PRESSURE: 112 MMHG | RESPIRATION RATE: 17 BRPM | WEIGHT: 120 LBS | OXYGEN SATURATION: 98 % | BODY MASS INDEX: 20.24 KG/M2 | DIASTOLIC BLOOD PRESSURE: 82 MMHG | TEMPERATURE: 98 F | HEIGHT: 64.5 IN | HEART RATE: 76 BPM

## 2021-10-28 PROCEDURE — 20610 DRAIN/INJ JOINT/BURSA W/O US: CPT | Mod: LT

## 2021-10-28 PROCEDURE — 99214 OFFICE O/P EST MOD 30 MIN: CPT | Mod: 25

## 2021-10-28 RX ORDER — METHYLPRED ACET/NACL,ISO-OS/PF 40 MG/ML
40 VIAL (ML) INJECTION
Qty: 2 | Refills: 0 | Status: COMPLETED | OUTPATIENT
Start: 2021-10-28

## 2021-10-28 RX ADMIN — METHYLPREDNISOLONE ACETATE MG/ML: 40 INJECTION, SUSPENSION INTRA-ARTICULAR; INTRALESIONAL; INTRAMUSCULAR; SOFT TISSUE at 00:00

## 2021-10-28 NOTE — ASSESSMENT
[FreeTextEntry1] : 58 year old female with polyarticular joint pains which appear to be multifactorial:\par 1)  Recent episode of acute on chronic pain of the right wrist s/p:  Acute symptoms most c/w inflammatory arthritis - most likely CPPD/pseudogout, given chondrocalcinosis on x-rays though would need arthrocentesis for definitive diagnosis (no effusion to aspirate at this time).  Etiology of chronic pain unclear, though currently virtually resolved.  MRI reveals tenosynovitis of the extensor carpi ulnaris and a moderate effusion of the radioulnar joint - ?was a remnant of recent flare, as no effusion upon exam at this time.\par   - Refer to PT / cont wrist exercises.\par   - ibuprofen prn.\par   - warm compresses\par   - OTC topical analgesics\par 2)  Pain in B/L hips - due to OA +  left-sided trochanteric bursitis;  also w/ B/L knee pain (L>R) - prior MRI reveals a complex tear of the left medial meniscus\par   - Injected Depo Medrol 80mg to left trochanteric bursa.\par   - Reiterated importance of exercise\par   - analgesia as above\par   - ortho f/u\par 3)  Right shoulder pain:  most c/w RTC tendinopathy - much improved.\par   - Cont shoulder exercises\par   - analgesia as above\par 4)  Osteoporosis w/ hx of multiple fractures.  Previously didn't tolerate Fosamax and failed Boniva IV.  Recent DEXA w improvement in spine though worse in hip and femoral neck\par   - On Prolia - next dose due after 2/3/22.\par   - Cont vit D\par   - weight bearing exercise.\par 5)  Recent hypokalemia: resolved upon repeat\par   - Cont to monitor.\par 6)  Vit D deficiency:  now WNL.\par   - Cont vit D supplementation.\par 7)  Right foot pain:  resolved.\par   - Pt to call if symptoms recur.\par 8)  Recent episode of pain/swelling/erythema on right forearm and wrist.  Pt's description more c/w cellulitis than crystal arthritis.\par   - Previously advised pt to call me immediately if symptoms recur.\par 9)  Hyponatremia:  asymptomatic\par   - Previously recommended decreasing fluid intake slightly.\par   - Repeat BMP.

## 2021-10-28 NOTE — PROCEDURE
[Today's Date:] : Date: [unfilled] [Soft Tissue Injection] : soft tissue injection was performed [Patient] : the patient [Risks] : risks [Benefits] : benefits [Alternatives] : alternatives [Consent Obtained] : written consent was obtained prior to the procedure and is detailed in the patient's record [Therapeutic] : therapeutic [#1 Site: ______] : #1 site identified in the [unfilled] [Ethyl Chloride] : ethyl chloride [___ ml Inj] : [unfilled] ~Uml [1%] : 1%  [Without Epi] : without epinephrine [Chlorhexidine] : chlorhexidine [22 gauge 1.5 inch] : A 22 gauge 1.5 inch needle was used [Depomedrol ___ mg] : Depomedrol [unfilled] mg [Tolerated Well] : the patient tolerated the procedure well [No Complications] : there were no complications [Instructions Given] : handouts/patient instructions were given to patient [Patient Instructed to Call] : patient was instructed to call if redness at site, a decrease in range of motion or an increase in pain is noted after procedure.

## 2021-10-28 NOTE — HISTORY OF PRESENT ILLNESS
[FreeTextEntry1] : Left hip pain has improved though not resolved - still worst w/ lying on her side.  Continues to experience intermittent pain in her right wrist, haider w/ cold or humid weather.  She also says it "jumps around"  - sometimes occurs medially, other times laterally.  Also still w/ intermittent pain in her medial right knee - has been occurring for the past 1-2 days.  \par  [Anorexia] : no anorexia [Weight Loss] : no weight loss [Malaise] : no malaise [Fever] : no fever [Chills] : no chills [Fatigue] : no fatigue [Malar Facial Rash] : no malar facial rash [Skin Lesions] : no lesions [Skin Nodules] : no skin nodules [Oral Ulcers] : no oral ulcers [Cough] : no cough

## 2021-11-18 ENCOUNTER — APPOINTMENT (OUTPATIENT)
Dept: ORTHOPEDIC SURGERY | Facility: CLINIC | Age: 61
End: 2021-11-18
Payer: MEDICARE

## 2021-11-18 VITALS
DIASTOLIC BLOOD PRESSURE: 79 MMHG | HEART RATE: 77 BPM | HEIGHT: 64.5 IN | BODY MASS INDEX: 20.24 KG/M2 | WEIGHT: 120 LBS | SYSTOLIC BLOOD PRESSURE: 113 MMHG

## 2021-11-18 DIAGNOSIS — T14.8XXA OTHER INJURY OF UNSPECIFIED BODY REGION, INITIAL ENCOUNTER: ICD-10-CM

## 2021-11-18 DIAGNOSIS — S63.639D SPRAIN OF INTERPHALANGEAL JOINT OF UNSPECIFIED FINGER, SUBSEQUENT ENCOUNTER: ICD-10-CM

## 2021-11-18 PROCEDURE — 99213 OFFICE O/P EST LOW 20 MIN: CPT

## 2022-02-03 ENCOUNTER — TRANSCRIPTION ENCOUNTER (OUTPATIENT)
Age: 62
End: 2022-02-03

## 2022-02-08 ENCOUNTER — APPOINTMENT (OUTPATIENT)
Dept: RHEUMATOLOGY | Facility: CLINIC | Age: 62
End: 2022-02-08
Payer: MEDICARE

## 2022-02-08 VITALS
HEART RATE: 65 BPM | WEIGHT: 122 LBS | OXYGEN SATURATION: 98 % | TEMPERATURE: 97.6 F | SYSTOLIC BLOOD PRESSURE: 106 MMHG | DIASTOLIC BLOOD PRESSURE: 76 MMHG | RESPIRATION RATE: 17 BRPM | HEIGHT: 64 IN | BODY MASS INDEX: 20.83 KG/M2

## 2022-02-08 DIAGNOSIS — M25.562 PAIN IN LEFT KNEE: ICD-10-CM

## 2022-02-08 DIAGNOSIS — E87.6 HYPOKALEMIA: ICD-10-CM

## 2022-02-08 DIAGNOSIS — E87.1 HYPO-OSMOLALITY AND HYPONATREMIA: ICD-10-CM

## 2022-02-08 PROCEDURE — 99214 OFFICE O/P EST MOD 30 MIN: CPT | Mod: 25

## 2022-02-08 PROCEDURE — 96372 THER/PROPH/DIAG INJ SC/IM: CPT

## 2022-02-08 RX ORDER — DENOSUMAB 60 MG/ML
60 INJECTION SUBCUTANEOUS
Qty: 60 | Refills: 0 | Status: COMPLETED | OUTPATIENT
Start: 2022-02-08

## 2022-02-08 RX ORDER — HUMAN IMMUNOGLOBULIN G 5 G/50ML
5 SOLUTION INTRAVENOUS
Refills: 0 | Status: ACTIVE | COMMUNITY

## 2022-02-08 RX ADMIN — DENOSUMAB 0 MG/ML: 60 INJECTION SUBCUTANEOUS at 00:00

## 2022-02-08 NOTE — PROCEDURE
[Today's Date:] : Date: [unfilled] [Soft Tissue Injection] : soft tissue injection was performed [Patient] : the patient [Risks] : risks [Benefits] : benefits [Alternatives] : alternatives [Consent Obtained] : written consent was obtained prior to the procedure and is detailed in the patient's record [Therapeutic] : therapeutic [#1 Site: ______] : #1 site identified in the [unfilled] [Alcohol] : alcohol [Tolerated Well] : the patient tolerated the procedure well [No Complications] : there were no complications [Instructions Given] : handouts/patient instructions were given to patient [Patient Instructed to Call] : patient was instructed to call if redness at site, a decrease in range of motion or an increase in pain is noted after procedure. [de-identified] : Prolia 60mg

## 2022-02-08 NOTE — ASSESSMENT
[FreeTextEntry1] : 58 year old female with polyarticular joint pains which appear to be multifactorial:\par 1)  Recent episode of acute on chronic pain of the right wrist s/p:  Acute symptoms most c/w inflammatory arthritis - most likely CPPD/pseudogout, given chondrocalcinosis on x-rays though would need arthrocentesis for definitive diagnosis (no effusion to aspirate at this time).  Etiology of chronic pain unclear, though currently virtually resolved.  MRI reveals tenosynovitis of the extensor carpi ulnaris and a moderate effusion of the radioulnar joint - ?was a remnant of recent flare, as no effusion upon exam at this time.\par   - Cont wrist exercises.\par   - ibuprofen prn.\par   - warm compresses\par   - OTC topical analgesics\par 2)  Pain in B/L hips - due to OA +  left-sided trochanteric bursitis - much improved s/p corticosteroid injection;  also w/ B/L knee pain (L>R) - prior MRI reveals a complex tear of the left medial meniscus\par   - Reiterated importance of exercise\par   - analgesia as above\par   - ortho f/u\par 3)  Right shoulder pain:  most c/w RTC tendinopathy - much improved.\par   - Cont shoulder exercises\par   - analgesia as above\par 4)  Osteoporosis w/ hx of multiple fractures.  Previously didn't tolerate Fosamax and failed Boniva IV.  Recent DEXA w improvement in spine though worse in hip and femoral neck\par   - Administered Prolia to RUE.\par   - Cont vit D\par   - weight bearing exercise.\par 5)  Recent hypokalemia: resolved upon repeat\par   - Cont to monitor.\par 6)  Vit D deficiency:  now WNL.\par   - Cont vit D supplementation.\par 7)  Right foot pain:  resolved.\par   - Pt to call if symptoms recur.\par 8)  Recent episode of pain/swelling/erythema on right forearm and wrist.  Pt's description more c/w cellulitis than crystal arthritis.\par   - Previously advised pt to call me immediately if symptoms recur.\par 9)  Hyponatremia:  asymptomatic.  Resolved upon repeat.\par \par Pt has received the COVID19 vaccine.  She is scheduled to receive the booster later today.

## 2022-02-08 NOTE — PHYSICAL EXAM
[General Appearance - Alert] : alert [General Appearance - In No Acute Distress] : in no acute distress [Sclera] : the sclera and conjunctiva were normal [Outer Ear] : the ears and nose were normal in appearance [Oropharynx] : the oropharynx was normal [Neck Appearance] : the appearance of the neck was normal [Neck Cervical Mass (___cm)] : no neck mass was observed [Jugular Venous Distention Increased] : there was no jugular-venous distention [Thyroid Diffuse Enlargement] : the thyroid was not enlarged [Thyroid Nodule] : there were no palpable thyroid nodules [Auscultation Breath Sounds / Voice Sounds] : lungs were clear to auscultation bilaterally [Heart Rate And Rhythm] : heart rate was normal and rhythm regular [Heart Sounds] : normal S1 and S2 [Heart Sounds Gallop] : no gallops [Murmurs] : no murmurs [Heart Sounds Pericardial Friction Rub] : no pericardial rub [Edema] : there was no peripheral edema [Bowel Sounds] : normal bowel sounds [Abdomen Soft] : soft [Abdomen Tenderness] : non-tender [Abdomen Mass (___ Cm)] : no abdominal mass palpated [Cervical Lymph Nodes Enlarged Posterior Bilaterally] : posterior cervical [Cervical Lymph Nodes Enlarged Anterior Bilaterally] : anterior cervical [Supraclavicular Lymph Nodes Enlarged Bilaterally] : supraclavicular [No Spinal Tenderness] : no spinal tenderness [Skin Color & Pigmentation] : normal skin color and pigmentation [Skin Turgor] : normal skin turgor [] : no rash [No Focal Deficits] : no focal deficits [Oriented To Time, Place, And Person] : oriented to person, place, and time [Impaired Insight] : insight and judgment were intact [Affect] : the affect was normal [FreeTextEntry1] : No synovitis;  (+)mild tenderness over ulnar surface of right wrist;  right shoulder w/ pain upon abduction and internal rotation;  B/L hips w/ pain upon internal rotation and abduction;  B/L knees w/ normal ROM

## 2022-02-08 NOTE — HISTORY OF PRESENT ILLNESS
[FreeTextEntry1] : Left hip pain much improved s/p C-S injection at last visit - now w/ occasional flares, usually w/ humid weather..  RIght wrist pain also much improved - now occurs rarely with certain movements or w/ cold or humid weather.  Still w/ intermittent left knee pain, worst w/ stairs.  No new complaints. [Anorexia] : no anorexia [Weight Loss] : no weight loss [Malaise] : no malaise [Fever] : no fever [Chills] : no chills [Fatigue] : no fatigue [Malar Facial Rash] : no malar facial rash [Skin Lesions] : no lesions [Skin Nodules] : no skin nodules [Oral Ulcers] : no oral ulcers [Cough] : no cough

## 2022-02-22 NOTE — PATIENT PROFILE ADULT - DO YOU FEEL LIKE HURTING YOURSELF OR OTHERS?
Left message with ok for orders requested below   
Ok to cancel Home Health Aide?  Patient refused service.  
Reason for Call: Request for an order or referral:    Order or referral being requested:   Remove Home health aide -  Patient refused services    Date needed: as soon as possible    Has the patient been seen by the PCP for this problem? NO    Additional comments: n/a    Phone number Patient can be reached at:  Other phone number:  [phone]    Best Time:  anytime    Can we leave a detailed message on this number?  YES    Call taken on 2/22/2022 at 9:04 AM by Claire Sawant  
yes  
no

## 2022-04-26 NOTE — ED ADULT NURSE NOTE - CAS EDP DISCH TYPE
Detail Level: Detailed Depth Of Biopsy: dermis Was A Bandage Applied: Yes Size Of Lesion In Cm: 0 Biopsy Type: H and E Biopsy Method: Dermablade Anesthesia Type: 1% lidocaine with epinephrine and a 1:10 solution of 8.4% sodium bicarbonate Anesthesia Volume In Cc: 0.5 Hemostasis: Drysol Wound Care: Petrolatum Dressing: bandage Destruction After The Procedure: No Type Of Destruction Used: Curettage Curettage Text: The wound bed was treated with curettage after the biopsy was performed. Cryotherapy Text: The wound bed was treated with cryotherapy after the biopsy was performed. Electrodesiccation Text: The wound bed was treated with electrodesiccation after the biopsy was performed. Electrodesiccation And Curettage Text: The wound bed was treated with electrodesiccation and curettage after the biopsy was performed. Silver Nitrate Text: The wound bed was treated with silver nitrate after the biopsy was performed. Lab: [id number] Lab Facility: [id number] Consent: Written consent was obtained and risks were reviewed including but not limited to scarring, infection, bleeding, scabbing, incomplete removal, nerve damage and allergy to anesthesia. Post-Care Instructions: I reviewed with the patient in detail post-care instructions. Patient is to keep the biopsy site dry overnight, and then apply vaseline twice daily until healed. Notification Instructions: Patient will be notified of biopsy results. However, patient instructed to call the office if not contacted within 2 weeks. Billing Type: Third-Party Bill Information: Selecting Yes will display possible errors in your note based on the variables you have selected. This validation is only offered as a suggestion for you. PLEASE NOTE THAT THE VALIDATION TEXT WILL BE REMOVED WHEN YOU FINALIZE YOUR NOTE. IF YOU WANT TO FAX A PRELIMINARY NOTE YOU WILL NEED TO TOGGLE THIS TO 'NO' IF YOU DO NOT WANT IT IN YOUR FAXED NOTE. Home

## 2022-05-10 ENCOUNTER — APPOINTMENT (OUTPATIENT)
Dept: RHEUMATOLOGY | Facility: CLINIC | Age: 62
End: 2022-05-10
Payer: MEDICARE

## 2022-05-10 VITALS
HEART RATE: 80 BPM | HEIGHT: 64 IN | SYSTOLIC BLOOD PRESSURE: 100 MMHG | BODY MASS INDEX: 21 KG/M2 | OXYGEN SATURATION: 99 % | RESPIRATION RATE: 17 BRPM | TEMPERATURE: 98 F | DIASTOLIC BLOOD PRESSURE: 70 MMHG | WEIGHT: 123 LBS

## 2022-05-10 PROCEDURE — 99214 OFFICE O/P EST MOD 30 MIN: CPT

## 2022-05-10 NOTE — ASSESSMENT
[FreeTextEntry1] : 58 year old female with polyarticular joint pains which appear to be multifactorial:\par 1)  Recent episode of acute on chronic pain of the right wrist s/p:  Acute symptoms most c/w inflammatory arthritis - most likely CPPD/pseudogout, given chondrocalcinosis on x-rays though would need arthrocentesis for definitive diagnosis (no effusion to aspirate at this time).  Etiology of chronic pain unclear.  Prior MRI revealed tenosynovitis of the extensor carpi ulnaris and a moderate effusion of the radioulnar joint - ?was a remnant of recent flare, as no effusion upon exam at this time.\par   - Cont wrist exercises.\par   - ibuprofen prn.\par   - warm compresses\par   - OTC topical analgesics\par 2)  Pain in B/L hips - due to OA +  left-sided trochanteric bursitis - much improved s/p corticosteroid injection;  also w/ B/L knee pain (L>R) - prior MRI reveals a complex tear of the left medial meniscus.  Also w/ pain/stiffness in her B/L hands - ?OA vs overuse\par   - Reiterated importance of exercise\par   - analgesia as above\par   - ortho f/u\par   - If no improvement of left knee pain by last visit, pt to consider corticosteroid injection.\par 3)  Right shoulder pain:  most c/w RTC tendinopathy - much improved.\par   - Cont shoulder exercises\par   - analgesia as above\par 4)  Osteoporosis w/ hx of multiple fractures.  Previously didn't tolerate Fosamax and failed Boniva IV.  Recent DEXA w improvement in spine though worse in hip and femoral neck\par   - On Prolia - next dose due after 8/8/22.\par   - Cont vit D\par   - weight bearing exercise.\par 5)  Recent hypokalemia: resolved upon repeat\par   - Cont to monitor.\par 6)  Vit D deficiency:  now WNL.\par   - Cont vit D supplementation.\par 7)  Right foot pain:  resolved.\par   - Pt to call if symptoms recur.\par 8)  Recent episode of pain/swelling/erythema on right forearm and wrist.  Pt's description more c/w cellulitis than crystal arthritis.\par   - Previously advised pt to call me immediately if symptoms recur.\par 9)  Hyponatremia:  asymptomatic.  Resolved upon repeat.\par \par Pt has received the COVID19 vaccine.  She is scheduled to receive the booster later today.

## 2022-05-10 NOTE — HISTORY OF PRESENT ILLNESS
[FreeTextEntry1] : Pt c/o stiffness in her hands, worst in the mornings.  Still w/ left knee pain, worst on stairs and w/ squatting.  Also still w/ pain in her right wrist.  Left hip pain remains much improved s/p recent C-S injection.  No other new complaints\par  [Anorexia] : no anorexia [Weight Loss] : no weight loss [Malaise] : no malaise [Fever] : no fever [Chills] : no chills [Fatigue] : no fatigue [Malar Facial Rash] : no malar facial rash [Skin Lesions] : no lesions [Skin Nodules] : no skin nodules [Oral Ulcers] : no oral ulcers [Cough] : no cough

## 2022-05-10 NOTE — PHYSICAL EXAM
[General Appearance - Alert] : alert [General Appearance - In No Acute Distress] : in no acute distress [Sclera] : the sclera and conjunctiva were normal [Outer Ear] : the ears and nose were normal in appearance [Oropharynx] : the oropharynx was normal [Neck Appearance] : the appearance of the neck was normal [Neck Cervical Mass (___cm)] : no neck mass was observed [Jugular Venous Distention Increased] : there was no jugular-venous distention [Thyroid Diffuse Enlargement] : the thyroid was not enlarged [Thyroid Nodule] : there were no palpable thyroid nodules [Auscultation Breath Sounds / Voice Sounds] : lungs were clear to auscultation bilaterally [Heart Rate And Rhythm] : heart rate was normal and rhythm regular [Heart Sounds] : normal S1 and S2 [Heart Sounds Gallop] : no gallops [Murmurs] : no murmurs [Heart Sounds Pericardial Friction Rub] : no pericardial rub [Edema] : there was no peripheral edema [Bowel Sounds] : normal bowel sounds [Abdomen Soft] : soft [Abdomen Tenderness] : non-tender [Abdomen Mass (___ Cm)] : no abdominal mass palpated [Cervical Lymph Nodes Enlarged Posterior Bilaterally] : posterior cervical [Cervical Lymph Nodes Enlarged Anterior Bilaterally] : anterior cervical [Supraclavicular Lymph Nodes Enlarged Bilaterally] : supraclavicular [No Spinal Tenderness] : no spinal tenderness [Skin Color & Pigmentation] : normal skin color and pigmentation [Skin Turgor] : normal skin turgor [] : no rash [No Focal Deficits] : no focal deficits [Oriented To Time, Place, And Person] : oriented to person, place, and time [Impaired Insight] : insight and judgment were intact [Affect] : the affect was normal [FreeTextEntry1] : No synovitis; (+)tenderness in right 1st IP;  right wrist w/ (+)tenderness, paikn upon flexion;  right shoulder w/ pain upon abduction and internal rotation;  B/L hips w/ pain upon internal rotation and abduction;  B/L knees w/ normal ROM

## 2022-08-03 ENCOUNTER — NON-APPOINTMENT (OUTPATIENT)
Age: 62
End: 2022-08-03

## 2022-08-11 ENCOUNTER — APPOINTMENT (OUTPATIENT)
Dept: RHEUMATOLOGY | Facility: CLINIC | Age: 62
End: 2022-08-11

## 2022-08-11 VITALS
SYSTOLIC BLOOD PRESSURE: 110 MMHG | TEMPERATURE: 97.7 F | DIASTOLIC BLOOD PRESSURE: 79 MMHG | HEART RATE: 91 BPM | OXYGEN SATURATION: 98 %

## 2022-08-11 PROCEDURE — 96372 THER/PROPH/DIAG INJ SC/IM: CPT

## 2022-08-11 PROCEDURE — 99214 OFFICE O/P EST MOD 30 MIN: CPT | Mod: 25

## 2022-08-11 RX ORDER — IMMUNE GLOBULIN (HUMAN) 10 G/100ML
5 INJECTION INTRAVENOUS; SUBCUTANEOUS
Qty: 600 | Refills: 0 | Status: ACTIVE | COMMUNITY
Start: 2022-06-10

## 2022-08-11 RX ORDER — DENOSUMAB 60 MG/ML
60 INJECTION SUBCUTANEOUS
Qty: 60 | Refills: 0 | Status: COMPLETED | OUTPATIENT
Start: 2022-08-11

## 2022-08-11 RX ORDER — IMMUNE GLOBULIN (HUMAN) 10 G/100ML
20 INJECTION INTRAVENOUS; SUBCUTANEOUS
Qty: 2400 | Refills: 0 | Status: ACTIVE | COMMUNITY
Start: 2022-06-10

## 2022-08-11 RX ADMIN — DENOSUMAB 0 MG/ML: 60 INJECTION SUBCUTANEOUS at 00:00

## 2022-08-11 NOTE — HISTORY OF PRESENT ILLNESS
[FreeTextEntry1] : Right wrist now painful only occasionally, though now w/ pain in her left wrist x 2 weeks - had also been swollen, erythematous.  She went to an urgent care center, where she was given "a steroid drink".  and colchicine 1.2mg x 1 then 0.6mg 1 hour later, on which her symptoms improved. Left hip pain now occurring occasionally.  Right knee also painful occasionally, haider on stairs or with long walks = improves w/ ibuprofen. .   [Anorexia] : no anorexia [Weight Loss] : no weight loss [Malaise] : no malaise [Fever] : no fever [Chills] : no chills [Fatigue] : no fatigue [Malar Facial Rash] : no malar facial rash [Skin Lesions] : no lesions [Skin Nodules] : no skin nodules [Oral Ulcers] : no oral ulcers [Cough] : no cough

## 2022-08-11 NOTE — ASSESSMENT
[FreeTextEntry1] : 58 year old female with polyarticular joint pains which appear to be multifactorial:\par 1)  Recent episodes of acute on chronic pain of the right wrist then left wrist:  Acute symptoms most c/w inflammatory arthritis - most likely CPPD/pseudogout, given chondrocalcinosis on x-rays though would need arthrocentesis for definitive diagnosis (no effusion to aspirate at this time).  Etiology of chronic pain unclear.  Prior MRI revealed tenosynovitis of the extensor carpi ulnaris and a moderate effusion of the radioulnar joint - ?was a remnant of recent flare, as no effusion upon exam at this time.  Chronic pain currently improved.\par   - Cont wrist exercises.\par   - ibuprofen prn.\par   - warm compresses\par   - OTC topical analgesics\par   - ADvised pt to call if she experiences another flare.\par 2)  Pain in B/L hips - due to OA +  left-sided trochanteric bursitis - much improved s/p corticosteroid injection;  also w/ B/L knee pain (L>R) - prior MRI reveals a complex tear of the left medial meniscus.  Also w/ pain/stiffness in her B/L hands - ?OA vs overuse.  Pain currently improved - now occurs occasionally\par   - Reiterated importance of exercise\par   - analgesia as above\par   - ortho f/u\par 3)  Right shoulder pain:  most c/w RTC tendinopathy - much improved.\par   - Cont shoulder exercises\par   - analgesia as above\par 4)  Osteoporosis w/ hx of multiple fractures.  Previously didn't tolerate Fosamax and failed Boniva IV.  Recent DEXA w improvement in spine though worse in hip and femoral neck\par   - Administered Prolia to LUE\par   - Cont vit D\par   - weight bearing exercise.\par 5)  Recent hypokalemia: resolved upon repeat\par   - Cont to monitor.\par 6)  Vit D deficiency:  now WNL.\par   - Cont vit D supplementation.\par 7)  Right foot pain:  resolved.\par   - Pt to call if symptoms recur.\par 8)  Recent episode of pain/swelling/erythema on right forearm and wrist.  Pt's description more c/w cellulitis than crystal arthritis.\par   - Previously advised pt to call me immediately if symptoms recur.\par 9)  Hyponatremia:  asymptomatic.  Resolved upon repeat.\par Pt has received the COVID19 vaccine + booster.

## 2022-08-11 NOTE — PROCEDURE
[Today's Date:] : Date: [unfilled] [Soft Tissue Injection] : soft tissue injection was performed [Patient] : the patient [Risks] : risks [Benefits] : benefits [Alternatives] : alternatives [Consent Obtained] : written consent was obtained prior to the procedure and is detailed in the patient's record [Therapeutic] : therapeutic [#1 Site: ______] : #1 site identified in the [unfilled] [Alcohol] : alcohol [Tolerated Well] : the patient tolerated the procedure well [No Complications] : there were no complications [Instructions Given] : handouts/patient instructions were given to patient [Patient Instructed to Call] : patient was instructed to call if redness at site, a decrease in range of motion or an increase in pain is noted after procedure. [de-identified] : Prolia 60mg

## 2022-09-16 NOTE — PROVIDER CONTACT NOTE (CRITICAL VALUE NOTIFICATION) - NAME OF MD/NP/PA/DO NOTIFIED:
Athletic Training Progress Note    Name: Meir Ward  Age: 21 y o  Assessment/Plan:     Visit Diagnosis: Strain of adductor petey muscle of left lower extremity, subsequent encounter [X01 400R]    Treatment Plan: Cont lower extremity rehab exercises as maintenance  []  Follow-up PRN  [x]  Follow-up prior to next practice/game for re-evaluation  []  Daily treatment/rehab  Progress note expected weekly  Subjective: Pt reports to the [facility] today to cont treatment for their lower extremity  Pt has not been having any issues playing or any pain  Pt stated that their leg feels as though it stretches more than it should  This sensation is not painful but feels as though theres more mobility  Objective:   Strength feels WNL    Treatment Log:     Date: 9/15/22 8/26/22 8/25/22   Playing Status: Full Go As Tolerated Limited         Exercise/Treatment      Ball squeezes  4x12 4x12   Wide Squat  4x12 4x12   Monster Walks  x8 line walks x8 line walks    SLR over ball 4x8 4# 4x8 4x8   Wall sit 3x1min                                           Pt will cont to make appointments PRN  yuliana

## 2022-09-19 NOTE — ED PROVIDER NOTE - NS_EDPROVIDERDISPOUSERTYPE_ED_A_ED
Assessment:   1. Hypothyroidism due to Hashimoto's thyroiditis      Plan: It is very important to take your thyroid medication on an empty stomach WITH WATER ONLY, not juice, not mil, and as the first medicine in the morning (unless you have been told to take it at bedtime on an empty stomach). You should take ton other medication or food for 1 hr after taking your thyroid medicine. Therefore, most people take their thyroid medicine immediately after awakening in the morning. You can take all other SUPPLEMENTS  (vitamins, minerals etc.) about 3 hours later, or better yet with lunch or dinner. 1. Hypothyroidism due to Hashimoto's thyroiditis  -     levothyroxine (SYNTHROID) 75 MCG tablet; Take 1 tablet by mouth daily, Disp-90 tablet, R-0Normal    Followup:  Return for an early morning thyroid stimulating hormone level in approximately 8 weeks,  Don't take your medication that morning before coming in. Return 8 wks lab only tsh, free t4.
Attending Attestation (For Attendings USE Only)...

## 2022-09-29 ENCOUNTER — NON-APPOINTMENT (OUTPATIENT)
Age: 62
End: 2022-09-29

## 2022-11-13 ENCOUNTER — NON-APPOINTMENT (OUTPATIENT)
Age: 62
End: 2022-11-13

## 2022-11-17 ENCOUNTER — APPOINTMENT (OUTPATIENT)
Dept: RHEUMATOLOGY | Facility: CLINIC | Age: 62
End: 2022-11-17

## 2022-11-17 VITALS
DIASTOLIC BLOOD PRESSURE: 60 MMHG | SYSTOLIC BLOOD PRESSURE: 100 MMHG | TEMPERATURE: 97.6 F | HEART RATE: 78 BPM | OXYGEN SATURATION: 98 %

## 2022-11-17 DIAGNOSIS — M25.531 PAIN IN RIGHT WRIST: ICD-10-CM

## 2022-11-17 PROCEDURE — 99214 OFFICE O/P EST MOD 30 MIN: CPT

## 2022-11-17 RX ORDER — AZELASTINE HYDROCHLORIDE 137 UG/1
0.1 SPRAY, METERED NASAL
Qty: 30 | Refills: 0 | Status: ACTIVE | COMMUNITY
Start: 2022-09-30

## 2022-11-17 RX ORDER — OXCARBAZEPINE 150 MG/1
TABLET, FILM COATED ORAL
Refills: 0 | Status: DISCONTINUED | COMMUNITY
End: 2022-11-17

## 2022-11-17 RX ORDER — CREAM BASE NO.31
CREAM (GRAM) MISCELLANEOUS
Qty: 1 | Refills: 0 | Status: DISCONTINUED | COMMUNITY
Start: 2019-06-11 | End: 2022-11-17

## 2022-11-17 NOTE — HISTORY OF PRESENT ILLNESS
[FreeTextEntry1] : Feeling better overall.  Still w/ B/L wrist pain (R>L), though more intermittent.  No acute flares since last visit.  B/L hip pain also improved, though still w/ pain upon lying on her sides or when first getting up in the mornings.  RIght knee pain has improved, though currently w/ left knee pain, primarily w/ stairs.\par  [Anorexia] : no anorexia [Weight Loss] : no weight loss [Malaise] : no malaise [Fever] : no fever [Chills] : no chills [Fatigue] : no fatigue [Malar Facial Rash] : no malar facial rash [Skin Lesions] : no lesions [Skin Nodules] : no skin nodules [Oral Ulcers] : no oral ulcers [Cough] : no cough

## 2022-11-17 NOTE — PHYSICAL EXAM
[General Appearance - Alert] : alert [General Appearance - In No Acute Distress] : in no acute distress [Sclera] : the sclera and conjunctiva were normal [Outer Ear] : the ears and nose were normal in appearance [Oropharynx] : the oropharynx was normal [Neck Appearance] : the appearance of the neck was normal [Neck Cervical Mass (___cm)] : no neck mass was observed [Jugular Venous Distention Increased] : there was no jugular-venous distention [Thyroid Nodule] : there were no palpable thyroid nodules [Thyroid Diffuse Enlargement] : the thyroid was not enlarged [Auscultation Breath Sounds / Voice Sounds] : lungs were clear to auscultation bilaterally [Heart Rate And Rhythm] : heart rate was normal and rhythm regular [Heart Sounds] : normal S1 and S2 [Heart Sounds Gallop] : no gallops [Murmurs] : no murmurs [Heart Sounds Pericardial Friction Rub] : no pericardial rub [Edema] : there was no peripheral edema [Bowel Sounds] : normal bowel sounds [Abdomen Soft] : soft [Abdomen Tenderness] : non-tender [Abdomen Mass (___ Cm)] : no abdominal mass palpated [Cervical Lymph Nodes Enlarged Posterior Bilaterally] : posterior cervical [Cervical Lymph Nodes Enlarged Anterior Bilaterally] : anterior cervical [Supraclavicular Lymph Nodes Enlarged Bilaterally] : supraclavicular [No Spinal Tenderness] : no spinal tenderness [Skin Color & Pigmentation] : normal skin color and pigmentation [Skin Turgor] : normal skin turgor [] : no rash [No Focal Deficits] : no focal deficits [Oriented To Time, Place, And Person] : oriented to person, place, and time [Impaired Insight] : insight and judgment were intact [Affect] : the affect was normal [FreeTextEntry1] : No synovitis;  normal ROM in all joints

## 2023-01-19 ENCOUNTER — NON-APPOINTMENT (OUTPATIENT)
Age: 63
End: 2023-01-19

## 2023-02-14 ENCOUNTER — APPOINTMENT (OUTPATIENT)
Dept: RHEUMATOLOGY | Facility: CLINIC | Age: 63
End: 2023-02-14
Payer: MEDICARE

## 2023-02-14 VITALS
SYSTOLIC BLOOD PRESSURE: 120 MMHG | HEART RATE: 70 BPM | DIASTOLIC BLOOD PRESSURE: 70 MMHG | RESPIRATION RATE: 17 BRPM | HEIGHT: 64 IN | TEMPERATURE: 98 F | BODY MASS INDEX: 21.34 KG/M2 | OXYGEN SATURATION: 98 % | WEIGHT: 125 LBS

## 2023-02-14 PROCEDURE — 99214 OFFICE O/P EST MOD 30 MIN: CPT | Mod: 25

## 2023-02-14 PROCEDURE — 96372 THER/PROPH/DIAG INJ SC/IM: CPT

## 2023-02-14 RX ORDER — DENOSUMAB 60 MG/ML
60 INJECTION SUBCUTANEOUS
Qty: 60 | Refills: 0 | Status: COMPLETED | OUTPATIENT
Start: 2023-02-14

## 2023-02-14 RX ADMIN — DENOSUMAB 0 MG/ML: 60 INJECTION SUBCUTANEOUS at 00:00

## 2023-02-14 NOTE — HISTORY OF PRESENT ILLNESS
[FreeTextEntry1] : Continues to feel better overall.  Still w/ intermittent B/L wrist pain (R>L).  No acute flares since last visit.  B/L hip pain remains improved,  RIght knee pain has improved, though still w/ left knee pain, primarily w/ stairs. She c/o generalized stiffness upon first getting up after prolonged inactivity.\par  [Anorexia] : no anorexia [Weight Loss] : no weight loss [Malaise] : no malaise [Fever] : no fever [Chills] : no chills [Fatigue] : no fatigue [Malar Facial Rash] : no malar facial rash [Skin Lesions] : no lesions [Skin Nodules] : no skin nodules [Oral Ulcers] : no oral ulcers [Cough] : no cough

## 2023-02-14 NOTE — ASSESSMENT
[FreeTextEntry1] : 62 year old female with polyarticular joint pains which appear to be multifactorial:\par 1)  Recent episodes of acute on chronic pain of the right wrist then left wrist:  Acute symptoms most c/w inflammatory arthritis - most likely CPPD/pseudogout, given chondrocalcinosis on x-rays though would need arthrocentesis for definitive diagnosis.  Etiology of chronic pain unclear.  Prior MRI revealed tenosynovitis of the extensor carpi ulnaris and a moderate effusion of the radioulnar joint - ?was a remnant of recent flare, as no effusion upon exam at this time.  Chronic pain currently improved overall.\par   - Cont hand therapy / wrist exercises.\par   - ibuprofen prn.\par   - warm compresses\par   - OTC topical analgesics\par   - Advised pt to call if she experiences another flare.\par 2)  Pain in B/L hips - due to OA +  left-sided trochanteric bursitis - much improved s/p corticosteroid injection;  also w/ B/L knee pain (L>R) - prior MRI reveals a complex tear of the left medial meniscus.  Also w/ pain/stiffness in her B/L hands - ?OA vs overuse.  Pain currently improved - now occurs occasionally\par   - Reiterated importance of exercise\par   - analgesia as above\par   - ortho f/u\par 3)  Right shoulder pain:  most c/w RTC tendinopathy - much improved.\par   - Cont shoulder exercises\par   - analgesia as above\par 4)  Osteoporosis w/ hx of multiple fractures.  Previously didn't tolerate Fosamax and failed Boniva IV.  Recent DEXA w/ improvement in spine though worse in hip and femoral neck\par   - Administered Prolia to LUE.\par   - Cont vit D\par   - weight bearing exercise.\par 5)  Recent hypokalemia: resolved upon repeat\par   - Cont to monitor.\par 6)  Vit D deficiency:  now WNL.\par   - Cont vit D supplementation.\par 7)  Right foot pain:  resolved.\par   - Pt to call if symptoms recur.\par 8)  Recent episode of pain/swelling/erythema on right forearm and wrist.  Pt's description more c/w cellulitis than crystal arthritis.\par   - Previously advised pt to call me immediately if symptoms recur.\par 9)  Hyponatremia:  asymptomatic.  Resolved upon repeat.

## 2023-02-14 NOTE — PROCEDURE
[Today's Date:] : Date: [unfilled] [Soft Tissue Injection] : soft tissue injection was performed [Patient] : the patient [Risks] : risks [Benefits] : benefits [Alternatives] : alternatives [Consent Obtained] : written consent was obtained prior to the procedure and is detailed in the patient's record [Therapeutic] : therapeutic [#1 Site: ______] : #1 site identified in the [unfilled] [Alcohol] : alcohol [Tolerated Well] : the patient tolerated the procedure well [No Complications] : there were no complications [Instructions Given] : handouts/patient instructions were given to patient [Patient Instructed to Call] : patient was instructed to call if redness at site, a decrease in range of motion or an increase in pain is noted after procedure. [de-identified] : Prolia 60mg

## 2023-02-15 ENCOUNTER — NON-APPOINTMENT (OUTPATIENT)
Age: 63
End: 2023-02-15

## 2023-03-01 ENCOUNTER — OFFICE (OUTPATIENT)
Dept: URBAN - METROPOLITAN AREA CLINIC 115 | Facility: CLINIC | Age: 63
Setting detail: OPHTHALMOLOGY
End: 2023-03-01
Payer: MEDICARE

## 2023-03-01 DIAGNOSIS — H53.2: ICD-10-CM

## 2023-03-01 DIAGNOSIS — G70.00: ICD-10-CM

## 2023-03-01 DIAGNOSIS — H35.033: ICD-10-CM

## 2023-03-01 DIAGNOSIS — H53.433: ICD-10-CM

## 2023-03-01 DIAGNOSIS — H25.13: ICD-10-CM

## 2023-03-01 DIAGNOSIS — H04.123: ICD-10-CM

## 2023-03-01 DIAGNOSIS — G50.0: ICD-10-CM

## 2023-03-01 DIAGNOSIS — H50.05: ICD-10-CM

## 2023-03-01 DIAGNOSIS — H02.403: ICD-10-CM

## 2023-03-01 DIAGNOSIS — H52.4: ICD-10-CM

## 2023-03-01 PROCEDURE — 92133 CPTRZD OPH DX IMG PST SGM ON: CPT | Performed by: OPHTHALMOLOGY

## 2023-03-01 PROCEDURE — 92015 DETERMINE REFRACTIVE STATE: CPT | Performed by: OPHTHALMOLOGY

## 2023-03-01 PROCEDURE — 92083 EXTENDED VISUAL FIELD XM: CPT | Performed by: OPHTHALMOLOGY

## 2023-03-01 PROCEDURE — 92014 COMPRE OPH EXAM EST PT 1/>: CPT | Performed by: OPHTHALMOLOGY

## 2023-03-01 ASSESSMENT — REFRACTION_MANIFEST
OD_AXIS: 075
OD_VA1: 20/25
OD_CYLINDER: -0.75
OD_ADD: +2.50
OD_AXIS: 060
OD_SPHERE: +1.75
OS_CYLINDER: -0.25
OS_SPHERE: +1.50
OD_CYLINDER: -0.75
OU_VA: 20/20
OS_AXIS: 120
OS_AXIS: 125
OS_SPHERE: +2.50
OS_CYLINDER: -0.50
OD_SPHERE: PL
OD_VPRISM: BD
OS_ADD: +2.50
OS_VA1: 20/20

## 2023-03-01 ASSESSMENT — SPHEQUIV_DERIVED
OS_SPHEQUIV: 2.375
OD_SPHEQUIV: -0.25
OD_SPHEQUIV: 1.375
OS_SPHEQUIV: 1.25
OS_SPHEQUIV: 1.25

## 2023-03-01 ASSESSMENT — REFRACTION_CURRENTRX
OS_AXIS: 180
OS_SPHERE: +2.25
OS_OVR_VA: 20/
OS_OVR_VA: 20/
OS_CYLINDER: 0.00
OD_OVR_VA: 20/
OS_ADD: +2.00
OD_VPRISM_DIRECTION: PROGS
OS_ADD: +2.25
OS_AXIS: 180
OD_AXIS: 018
OD_ADD: +2.50
OD_VPRISM_DIRECTION: PROGS
OS_SPHERE: +2.50
OD_ADD: +2.00
OD_ADD: +2.25
OD_CYLINDER: -0.25
OS_CYLINDER: 0.00
OD_OVR_VA: 20/
OS_AXIS: 180
OS_ADD: +2.50
OD_OVR_VA: 20/
OS_SPHERE: +2.25
OD_AXIS: 180
OD_VPRISM_DIRECTION: PROGS
OD_SPHERE: +0.50
OS_VPRISM_DIRECTION: PROGS
OD_SPHERE: +2.50
OS_VPRISM_DIRECTION: PROGS
OD_AXIS: 072
OD_CYLINDER: -0.25
OS_VPRISM_DIRECTION: PROGS
OS_CYLINDER: -0.25
OS_OVR_VA: 20/
OD_CYLINDER: 0.00
OD_SPHERE: +2.25

## 2023-03-01 ASSESSMENT — LID POSITION - PTOSIS
OD_PTOSIS: RUL T
OS_PTOSIS: LUL T

## 2023-03-01 ASSESSMENT — TONOMETRY
OD_IOP_MMHG: 16
OS_IOP_MMHG: 14

## 2023-03-01 ASSESSMENT — REFRACTION_AUTOREFRACTION
OD_SPHERE: 0.00
OD_CYLINDER: -0.50
OD_AXIS: 061
OS_SPHERE: +1.50
OS_CYLINDER: -0.50
OS_AXIS: 128

## 2023-03-01 ASSESSMENT — VISUAL ACUITY
OD_BCVA: 20/40
OS_BCVA: 20/50+1

## 2023-03-01 ASSESSMENT — CONFRONTATIONAL VISUAL FIELD TEST (CVF)
OS_FINDINGS: FULL
OD_FINDINGS: FULL

## 2023-03-27 ENCOUNTER — NON-APPOINTMENT (OUTPATIENT)
Age: 63
End: 2023-03-27

## 2023-05-16 ENCOUNTER — APPOINTMENT (OUTPATIENT)
Dept: RHEUMATOLOGY | Facility: CLINIC | Age: 63
End: 2023-05-16
Payer: MEDICARE

## 2023-05-16 VITALS
OXYGEN SATURATION: 99 % | SYSTOLIC BLOOD PRESSURE: 120 MMHG | TEMPERATURE: 98 F | HEART RATE: 76 BPM | DIASTOLIC BLOOD PRESSURE: 68 MMHG | RESPIRATION RATE: 17 BRPM | HEIGHT: 64 IN | BODY MASS INDEX: 21.34 KG/M2 | WEIGHT: 125 LBS

## 2023-05-16 PROCEDURE — 99214 OFFICE O/P EST MOD 30 MIN: CPT | Mod: 25

## 2023-05-16 PROCEDURE — 36415 COLL VENOUS BLD VENIPUNCTURE: CPT

## 2023-05-16 RX ORDER — COLCHICINE 0.6 MG/1
0.6 TABLET ORAL
Qty: 3 | Refills: 0 | Status: DISCONTINUED | COMMUNITY
Start: 2022-08-04 | End: 2023-05-16

## 2023-05-16 RX ORDER — OMEPRAZOLE 20 MG/1
20 CAPSULE, DELAYED RELEASE ORAL
Qty: 90 | Refills: 0 | Status: DISCONTINUED | COMMUNITY
Start: 2022-10-28 | End: 2023-05-16

## 2023-05-16 RX ORDER — IBUPROFEN 600 MG/1
600 TABLET, FILM COATED ORAL
Qty: 15 | Refills: 0 | Status: DISCONTINUED | COMMUNITY
Start: 2022-09-30 | End: 2023-05-16

## 2023-05-16 NOTE — ASSESSMENT
[FreeTextEntry1] : 62 year old female with polyarticular joint pains which appear to be multifactorial:\par 1)  Recent episodes of acute on chronic pain of the right wrist then left wrist:  Acute symptoms most c/w inflammatory arthritis - most likely CPPD/pseudogout, given chondrocalcinosis on x-rays though would need arthrocentesis for definitive diagnosis.  Etiology of chronic pain unclear.  Prior MRI revealed tenosynovitis of the extensor carpi ulnaris and a moderate effusion of the radioulnar joint - ?was a remnant of recent flare, as no effusion upon exam at this time.  Chronic pain currently improved overall.\par   - Cont hand therapy / wrist exercises.\par   - ibuprofen prn.\par   - warm compresses\par   - OTC topical analgesics\par   - Advised pt to call if she experiences another flare.\par 2)  Pain in B/L hips - due to OA +  left-sided trochanteric bursitis - much improved s/p corticosteroid injection;  also w/ B/L knee pain (L>R) - prior MRI reveals a complex tear of the left medial meniscus.  Also w/ pain/stiffness in her B/L hands - ?OA vs overuse.  Pain currently improved - now occurs occasionally\par   - Reiterated importance of exercise\par   - analgesia as above\par   - ortho f/u\par 3)  Right shoulder pain:  most c/w RTC tendinopathy - much improved.\par   - Cont shoulder exercises\par   - analgesia as above\par 4)  Osteoporosis w/ hx of multiple fractures.  Previously didn't tolerate Fosamax and failed Boniva IV.  Recent DEXA w/ improvement in spine though worse in hip and femoral neck\par   - On Prolia - next dose due after 8/14/23.\par   - Cont vit D\par   - weight bearing exercise.\par   - Repeat DXA\par   - Check vit D 25-OH\par 5)  Recent hypokalemia: resolved upon repeat\par   - Cont to monitor.\par 6)  Vit D deficiency:  now WNL.\par   - Cont vit D supplementation.\par 7)  Right foot pain:  resolved.\par   - Pt to call if symptoms recur.\par 8)  Recent episode of pain/swelling/erythema on right forearm and wrist.  Pt's description more c/w cellulitis than crystal arthritis.\par   - Previously advised pt to call me immediately if symptoms recur.\par 9)  Hyponatremia:  asymptomatic.  Resolved upon repeat.\par 10): Anemia:  unclear etiology\par   - Repeat CBC, \par   - Anemia w/u

## 2023-05-16 NOTE — PHYSICAL EXAM
[General Appearance - Alert] : alert [General Appearance - In No Acute Distress] : in no acute distress [Sclera] : the sclera and conjunctiva were normal [Outer Ear] : the ears and nose were normal in appearance [Oropharynx] : the oropharynx was normal [Neck Appearance] : the appearance of the neck was normal [Neck Cervical Mass (___cm)] : no neck mass was observed [Jugular Venous Distention Increased] : there was no jugular-venous distention [Thyroid Diffuse Enlargement] : the thyroid was not enlarged [Thyroid Nodule] : there were no palpable thyroid nodules [Auscultation Breath Sounds / Voice Sounds] : lungs were clear to auscultation bilaterally [Heart Rate And Rhythm] : heart rate was normal and rhythm regular [Heart Sounds] : normal S1 and S2 [Heart Sounds Gallop] : no gallops [Murmurs] : no murmurs [Heart Sounds Pericardial Friction Rub] : no pericardial rub [Edema] : there was no peripheral edema [Bowel Sounds] : normal bowel sounds [Abdomen Soft] : soft [Abdomen Tenderness] : non-tender [Abdomen Mass (___ Cm)] : no abdominal mass palpated [Cervical Lymph Nodes Enlarged Posterior Bilaterally] : posterior cervical [Cervical Lymph Nodes Enlarged Anterior Bilaterally] : anterior cervical [Supraclavicular Lymph Nodes Enlarged Bilaterally] : supraclavicular [No Spinal Tenderness] : no spinal tenderness [Skin Color & Pigmentation] : normal skin color and pigmentation [Skin Turgor] : normal skin turgor [] : no rash [No Focal Deficits] : no focal deficits [Oriented To Time, Place, And Person] : oriented to person, place, and time [Impaired Insight] : insight and judgment were intact [Affect] : the affect was normal [FreeTextEntry1] : No synovitis;  B/L hips w/ mild pain upon internal rotation; normal ROM in rest of joints

## 2023-05-16 NOTE — DATA REVIEWED
[FreeTextEntry1] : Hgb 8.9; rest of CBC unremarkable\par CMP unremarkable\par UA:  no blood, no protein

## 2023-05-17 ENCOUNTER — NON-APPOINTMENT (OUTPATIENT)
Age: 63
End: 2023-05-17

## 2023-05-17 DIAGNOSIS — E53.8 DEFICIENCY OF OTHER SPECIFIED B GROUP VITAMINS: ICD-10-CM

## 2023-05-17 LAB
25(OH)D3 SERPL-MCNC: 31 NG/ML
FERRITIN SERPL-MCNC: 11 NG/ML
FOLATE SERPL-MCNC: 4.2 NG/ML
HAPTOGLOB SERPL-MCNC: 146 MG/DL
IRON SATN MFR SERPL: 5 %
IRON SERPL-MCNC: 19 UG/DL
LDH SERPL-CCNC: 153 U/L
RBC # BLD: 3.84 M/UL
RETICS # AUTO: 0.9 %
RETICS AGGREG/RBC NFR: 34.9 K/UL
TIBC SERPL-MCNC: 393 UG/DL
UIBC SERPL-MCNC: 374 UG/DL
VIT B12 SERPL-MCNC: 426 PG/ML

## 2023-05-17 RX ORDER — FOLIC ACID 1 MG/1
1 TABLET ORAL DAILY
Qty: 30 | Refills: 5 | Status: ACTIVE | COMMUNITY
Start: 2023-05-17 | End: 1900-01-01

## 2023-05-18 LAB
BASOPHILS # BLD AUTO: 0.05 K/UL
BASOPHILS NFR BLD AUTO: 1 %
EOSINOPHIL # BLD AUTO: 0.07 K/UL
EOSINOPHIL NFR BLD AUTO: 1.4 %
HCT VFR BLD CALC: 32.9 %
HGB BLD-MCNC: 9.5 G/DL
IMM GRANULOCYTES NFR BLD AUTO: 0.2 %
LYMPHOCYTES # BLD AUTO: 2.09 K/UL
LYMPHOCYTES NFR BLD AUTO: 40.3 %
MAN DIFF?: NORMAL
MCHC RBC-ENTMCNC: 24.1 PG
MCHC RBC-ENTMCNC: 28.9 GM/DL
MCV RBC AUTO: 83.3 FL
MONOCYTES # BLD AUTO: 0.32 K/UL
MONOCYTES NFR BLD AUTO: 6.2 %
NEUTROPHILS # BLD AUTO: 2.64 K/UL
NEUTROPHILS NFR BLD AUTO: 50.9 %
PLATELET # BLD AUTO: 368 K/UL
RBC # BLD: 3.95 M/UL
RBC # FLD: 17.6 %
WBC # FLD AUTO: 5.18 K/UL

## 2023-08-17 ENCOUNTER — APPOINTMENT (OUTPATIENT)
Dept: RHEUMATOLOGY | Facility: CLINIC | Age: 63
End: 2023-08-17
Payer: MEDICARE

## 2023-08-17 VITALS
RESPIRATION RATE: 17 BRPM | HEART RATE: 84 BPM | HEIGHT: 64 IN | OXYGEN SATURATION: 98 % | SYSTOLIC BLOOD PRESSURE: 112 MMHG | DIASTOLIC BLOOD PRESSURE: 62 MMHG | WEIGHT: 125 LBS | BODY MASS INDEX: 21.34 KG/M2

## 2023-08-17 PROCEDURE — 96372 THER/PROPH/DIAG INJ SC/IM: CPT

## 2023-08-17 PROCEDURE — 99214 OFFICE O/P EST MOD 30 MIN: CPT | Mod: 25

## 2023-08-17 RX ORDER — DENOSUMAB 60 MG/ML
60 INJECTION SUBCUTANEOUS
Qty: 0 | Refills: 0 | Status: COMPLETED | OUTPATIENT
Start: 2023-08-17

## 2023-08-17 RX ADMIN — DENOSUMAB 0 MG/ML: 60 INJECTION SUBCUTANEOUS at 00:00

## 2023-08-17 NOTE — HISTORY OF PRESENT ILLNESS
[FreeTextEntry1] : Left hip pain worse since last visit.  B/L knee pain had been improved, though w/ right knee pain today.  B/L shoulder pain has improved.  No other complaints.  [Anorexia] : no anorexia [Weight Loss] : no weight loss [Malaise] : no malaise [Fever] : no fever [Chills] : no chills [Fatigue] : no fatigue [Malar Facial Rash] : no malar facial rash [Skin Lesions] : no lesions [Skin Nodules] : no skin nodules [Oral Ulcers] : no oral ulcers [Cough] : no cough

## 2023-08-17 NOTE — ASSESSMENT
[FreeTextEntry1] : 62 year old female with polyarticular joint pains which appear to be multifactorial: 1)  Recent episodes of acute on chronic pain of the right wrist then left wrist:  Acute symptoms most c/w inflammatory arthritis - most likely CPPD/pseudogout, given chondrocalcinosis on x-rays though would need arthrocentesis for definitive diagnosis.  Etiology of chronic pain unclear.  Prior MRI revealed tenosynovitis of the extensor carpi ulnaris and a moderate effusion of the radioulnar joint - ?was a remnant of recent flare, as no effusion upon exam at this time.  Chronic pain currently improved overall.   - Cont hand therapy / wrist exercises.   - ibuprofen prn.   - warm compresses   - OTC topical analgesics   - Advised pt to call if she experiences another flare. 2)  Pain in B/L hips - due to OA +  left-sided trochanteric bursitis - bursitis had improved s/p previous corticosteroid injection, though now again w/ mild pain;  also w/ B/L knee pain (L>R) - prior MRI reveals a complex tear of the left medial meniscus.  Also w/ pain/stiffness in her B/L hands - ?OA vs overuse.  Pain currently improved - now occurs occasionally   - Reiterated importance of exercise   - analgesia as above   - ortho f/u 3)  Right shoulder pain:  most c/w RTC tendinopathy - much improved.   - Cont shoulder exercises   - analgesia as above 4)  Osteoporosis w/ hx of multiple fractures.  Previously didn't tolerate Fosamax and failed Boniva IV.  Recent DEXA w/ improvement in spine though worse in hip and femoral neck   - Administered Prolia 50mg to LUE.   - Cont vit D   - weight bearing exercise.   - Repeat DEXA 5)  Recent hypokalemia: resolved upon repeat   - Cont to monitor. 6)  Vit D deficiency:  now WNL.   - Cont vit D supplementation. 7)  Right foot pain:  resolved.   - Pt to call if symptoms recur. 8)  Recent episode of pain/swelling/erythema on right forearm and wrist.  Pt's description more c/w cellulitis than crystal arthritis.   - Previously advised pt to call me immediately if symptoms recur. 9)  Hyponatremia:  asymptomatic.  Resolved upon repeat. 10): Anemia: due to JOVAN   - Pt has been referred to heme by her PMD.

## 2023-08-17 NOTE — PROCEDURE
[Today's Date:] : Date: [unfilled] [Soft Tissue Injection] : soft tissue injection was performed [Patient] : the patient [Risks] : risks [Benefits] : benefits [Alternatives] : alternatives [Consent Obtained] : written consent was obtained prior to the procedure and is detailed in the patient's record [Therapeutic] : therapeutic [#1 Site: ______] : #1 site identified in the [unfilled] [Alcohol] : alcohol [Tolerated Well] : the patient tolerated the procedure well [No Complications] : there were no complications [Instructions Given] : handouts/patient instructions were given to patient [Patient Instructed to Call] : patient was instructed to call if redness at site, a decrease in range of motion or an increase in pain is noted after procedure. [de-identified] : Prolia 60mg

## 2023-08-17 NOTE — PHYSICAL EXAM
[General Appearance - Alert] : alert [General Appearance - In No Acute Distress] : in no acute distress [Sclera] : the sclera and conjunctiva were normal [Outer Ear] : the ears and nose were normal in appearance [Oropharynx] : the oropharynx was normal [Neck Appearance] : the appearance of the neck was normal [Neck Cervical Mass (___cm)] : no neck mass was observed [Jugular Venous Distention Increased] : there was no jugular-venous distention [Thyroid Diffuse Enlargement] : the thyroid was not enlarged [Thyroid Nodule] : there were no palpable thyroid nodules [Auscultation Breath Sounds / Voice Sounds] : lungs were clear to auscultation bilaterally [Heart Rate And Rhythm] : heart rate was normal and rhythm regular [Heart Sounds] : normal S1 and S2 [Heart Sounds Gallop] : no gallops [Murmurs] : no murmurs [Heart Sounds Pericardial Friction Rub] : no pericardial rub [Edema] : there was no peripheral edema [Bowel Sounds] : normal bowel sounds [Abdomen Soft] : soft [Abdomen Tenderness] : non-tender [Abdomen Mass (___ Cm)] : no abdominal mass palpated [Cervical Lymph Nodes Enlarged Posterior Bilaterally] : posterior cervical [Cervical Lymph Nodes Enlarged Anterior Bilaterally] : anterior cervical [Supraclavicular Lymph Nodes Enlarged Bilaterally] : supraclavicular [No Spinal Tenderness] : no spinal tenderness [Skin Color & Pigmentation] : normal skin color and pigmentation [Skin Turgor] : normal skin turgor [] : no rash [No Focal Deficits] : no focal deficits [Oriented To Time, Place, And Person] : oriented to person, place, and time [Impaired Insight] : insight and judgment were intact [Affect] : the affect was normal [FreeTextEntry1] : No synovitis;  B/L hips w/ mild pain upon internal rotation; normal ROM in rest of joints;  (+)tenderness over left trochanteric bursa

## 2023-10-24 ENCOUNTER — NON-APPOINTMENT (OUTPATIENT)
Age: 63
End: 2023-10-24

## 2023-12-14 ENCOUNTER — APPOINTMENT (OUTPATIENT)
Dept: RHEUMATOLOGY | Facility: CLINIC | Age: 63
End: 2023-12-14
Payer: MEDICARE

## 2023-12-14 VITALS
TEMPERATURE: 98.3 F | SYSTOLIC BLOOD PRESSURE: 122 MMHG | HEART RATE: 80 BPM | DIASTOLIC BLOOD PRESSURE: 78 MMHG | HEIGHT: 64 IN

## 2023-12-14 PROCEDURE — 99214 OFFICE O/P EST MOD 30 MIN: CPT

## 2023-12-14 NOTE — ASSESSMENT
[FreeTextEntry1] : 63 year old female with polyarticular joint pains which appear to be multifactorial: 1) Recent episodes of acute on chronic pain of the right wrist then left wrist: Acute symptoms most c/w inflammatory arthritis - most likely CPPD/pseudogout, given chondrocalcinosis on x-rays though would need arthrocentesis for definitive diagnosis. Etiology of chronic pain unclear. Prior MRI revealed tenosynovitis of the extensor carpi ulnaris and a moderate effusion of the radioulnar joint - ?was a remnant of recent flare, as no effusion upon exam at this time. Chronic pain currently improved overall.  - Cont hand therapy / wrist exercises.  - ibuprofen prn.  - warm compresses  - OTC topical analgesics  - Advised pt to call if she experiences another flare. 2) Pain in B/L hips - due to OA + left-sided trochanteric bursitis - bursitis had improved s/p previous corticosteroid injection, though now again w/ mild pain; also w/ B/L knee pain (L>R) - prior MRI reveals a complex tear of the left medial meniscus.  Also w/ pain/stiffness in her B/L hands - ?OA vs overuse. Pain currently improved - now occurs occasionally  - Reiterated importance of exercise  - analgesia as above  - ortho f/u 3) Right shoulder pain: most c/w RTC tendinopathy - much improved.  - Cont shoulder exercises  - analgesia as above 4) Osteoporosis w/ hx of multiple fractures. Previously didn't tolerate Fosamax and failed Boniva IV. Recent DEXA much improved.  - On Prolia - next dose due after 2/17/24.  - Cont vit D  - weight bearing exercise. 5) Recent hypokalemia: resolved upon repeat  - Cont to monitor. 6) Vit D deficiency: now WNL.  - Cont vit D supplementation. 7) Right foot pain: resolved.  - Pt to call if symptoms recur. 8) Recent episode of pain/swelling/erythema on right forearm and wrist. Pt's description more c/w cellulitis than crystal arthritis.  - Previously advised pt to call me immediately if symptoms recur. 9) Hyponatremia: asymptomatic. Resolved upon repeat. 10): Anemia: due to JOVAN  - Pt has been referred to heme by her PMD.

## 2023-12-14 NOTE — DATA REVIEWED
[FreeTextEntry1] : DEXA: T-scores:  spine -2.5;  left FN -.26;  left TH -2.1;  right FN -2.5; right TH -2.7

## 2023-12-14 NOTE — HISTORY OF PRESENT ILLNESS
[FreeTextEntry1] : Feeling "the same" overall since last visit,  Left hip pain better - occurs only occasionally.  B/L knee pain also improved.  B/L shoulder pain remains improved as well.  No other complaints.  [Anorexia] : no anorexia [Weight Loss] : no weight loss [Malaise] : no malaise [Fever] : no fever [Chills] : no chills [Fatigue] : no fatigue [Malar Facial Rash] : no malar facial rash [Skin Lesions] : no lesions [Skin Nodules] : no skin nodules [Oral Ulcers] : no oral ulcers [Cough] : no cough

## 2024-02-22 ENCOUNTER — APPOINTMENT (OUTPATIENT)
Dept: RHEUMATOLOGY | Facility: CLINIC | Age: 64
End: 2024-02-22

## 2024-03-28 ENCOUNTER — APPOINTMENT (OUTPATIENT)
Dept: RHEUMATOLOGY | Facility: CLINIC | Age: 64
End: 2024-03-28
Payer: MEDICARE

## 2024-03-28 VITALS
RESPIRATION RATE: 17 BRPM | WEIGHT: 125 LBS | BODY MASS INDEX: 21.34 KG/M2 | OXYGEN SATURATION: 97 % | HEART RATE: 73 BPM | DIASTOLIC BLOOD PRESSURE: 78 MMHG | SYSTOLIC BLOOD PRESSURE: 124 MMHG | TEMPERATURE: 98 F | HEIGHT: 64 IN

## 2024-03-28 DIAGNOSIS — M17.12 UNILATERAL PRIMARY OSTEOARTHRITIS, LEFT KNEE: ICD-10-CM

## 2024-03-28 DIAGNOSIS — M70.62 TROCHANTERIC BURSITIS, LEFT HIP: ICD-10-CM

## 2024-03-28 DIAGNOSIS — M13.0 POLYARTHRITIS, UNSPECIFIED: ICD-10-CM

## 2024-03-28 DIAGNOSIS — D64.9 ANEMIA, UNSPECIFIED: ICD-10-CM

## 2024-03-28 DIAGNOSIS — S83.242D OTHER TEAR OF MEDIAL MENISCUS, CURRENT INJURY, LEFT KNEE, SUBSEQUENT ENCOUNTER: ICD-10-CM

## 2024-03-28 DIAGNOSIS — M11.231 OTHER CHONDROCALCINOSIS, RIGHT WRIST: ICD-10-CM

## 2024-03-28 DIAGNOSIS — M25.531 PAIN IN RIGHT WRIST: ICD-10-CM

## 2024-03-28 DIAGNOSIS — M81.0 AGE-RELATED OSTEOPOROSIS W/OUT CURRENT PATHOLOGICAL FRACTURE: ICD-10-CM

## 2024-03-28 DIAGNOSIS — E55.9 VITAMIN D DEFICIENCY, UNSPECIFIED: ICD-10-CM

## 2024-03-28 DIAGNOSIS — M75.81 OTHER SHOULDER LESIONS, RIGHT SHOULDER: ICD-10-CM

## 2024-03-28 DIAGNOSIS — M25.532 PAIN IN RIGHT WRIST: ICD-10-CM

## 2024-03-28 PROCEDURE — 96372 THER/PROPH/DIAG INJ SC/IM: CPT

## 2024-03-28 PROCEDURE — 99214 OFFICE O/P EST MOD 30 MIN: CPT | Mod: 25

## 2024-03-28 RX ORDER — DENOSUMAB 60 MG/ML
60 INJECTION SUBCUTANEOUS
Qty: 0 | Refills: 0 | Status: COMPLETED | OUTPATIENT
Start: 2024-03-28

## 2024-03-28 RX ORDER — DENOSUMAB 60 MG/ML
60 INJECTION SUBCUTANEOUS
Qty: 1 | Refills: 1 | Status: ACTIVE | COMMUNITY
Start: 2022-08-10

## 2024-03-28 RX ADMIN — DENOSUMAB 0 MG/ML: 60 INJECTION SUBCUTANEOUS at 00:00

## 2024-03-28 NOTE — PHYSICAL EXAM
[General Appearance - Alert] : alert [General Appearance - In No Acute Distress] : in no acute distress [Sclera] : the sclera and conjunctiva were normal [Outer Ear] : the ears and nose were normal in appearance [Oropharynx] : the oropharynx was normal [Neck Appearance] : the appearance of the neck was normal [Neck Cervical Mass (___cm)] : no neck mass was observed [Jugular Venous Distention Increased] : there was no jugular-venous distention [Thyroid Diffuse Enlargement] : the thyroid was not enlarged [Thyroid Nodule] : there were no palpable thyroid nodules [Auscultation Breath Sounds / Voice Sounds] : lungs were clear to auscultation bilaterally [Heart Rate And Rhythm] : heart rate was normal and rhythm regular [Heart Sounds] : normal S1 and S2 [Heart Sounds Gallop] : no gallops [Murmurs] : no murmurs [Heart Sounds Pericardial Friction Rub] : no pericardial rub [Edema] : there was no peripheral edema [Bowel Sounds] : normal bowel sounds [Abdomen Soft] : soft [Abdomen Tenderness] : non-tender [Abdomen Mass (___ Cm)] : no abdominal mass palpated [Cervical Lymph Nodes Enlarged Posterior Bilaterally] : posterior cervical [Cervical Lymph Nodes Enlarged Anterior Bilaterally] : anterior cervical [Supraclavicular Lymph Nodes Enlarged Bilaterally] : supraclavicular [No Spinal Tenderness] : no spinal tenderness [Skin Color & Pigmentation] : normal skin color and pigmentation [Skin Turgor] : normal skin turgor [] : no rash [No Focal Deficits] : no focal deficits [Oriented To Time, Place, And Person] : oriented to person, place, and time [Impaired Insight] : insight and judgment were intact [Affect] : the affect was normal [FreeTextEntry1] : No synovitis;  B/L hips w/ "discomfort" upon internal rotation; normal ROM in rest of joints

## 2024-03-28 NOTE — ASSESSMENT
[FreeTextEntry1] : 63 year old female with polyarticular joint pains which appear to be multifactorial: 1) Previous episodes of acute on chronic pain of the right wrist then left wrist: Acute symptoms most c/w inflammatory arthritis - most likely CPPD/pseudogout, given chondrocalcinosis on x-rays though would need arthrocentesis for definitive diagnosis.  Prior MRI revealed tenosynovitis of the extensor carpi ulnaris and a moderate effusion of the radioulnar joint - ?was a remnant of recent flare, as no effusion upon exam at this time. Chronic pain has virtually resolved.  - Cont hand / wrist exercises.  - ibuprofen prn.  - warm compresses  - OTC topical analgesics  - Advised pt to call if she experiences another flare. 2) Pain in B/L hips - due to OA + left-sided trochanteric bursitis - bursitis had improved s/p previous corticosteroid injection, though now again w/ mild pain; also w/ B/L knee pain (L>R) - prior MRI reveals a complex tear of the left medial meniscus.  Also w/ pain/stiffness in her B/L hands - ?OA vs overuse. Pain currently improved - now occurs occasionally  - Reiterated importance of exercise  - analgesia as above  - ortho f/u 3) Right shoulder pain: most c/w RTC tendinopathy - much improved.  - Cont shoulder exercises  - analgesia as above 4) Osteoporosis w/ hx of multiple fractures. Previously didn't tolerate Fosamax and failed Boniva IV. Recent DEXA much improved.  - Administered Prolia 60mg to LUE.    - Cont vit D  - weight bearing exercise. 5) Recent hypokalemia: resolved upon repeat  - Cont to monitor. 6) Vit D deficiency: now WNL.  - Cont vit D supplementation. 7) Right foot pain: resolved.  - Pt to call if symptoms recur. 8) Recent episode of pain/swelling/erythema on right forearm and wrist. Pt's description more c/w cellulitis than crystal arthritis.  - Previously advised pt to call me immediately if symptoms recur. 9) Hyponatremia: asymptomatic. Resolved upon repeat. 10): Anemia: due to JOVAN  - Pt has been referred to heme by her PMD.

## 2024-03-28 NOTE — HISTORY OF PRESENT ILLNESS
[FreeTextEntry1] : Feeling "the same" overall since last visit,  Left hip pain remains better - occurs only occasionally.  B/L knee pain also improved.  B/L shoulder pain remains much improved as well.  No other complaints.  [Anorexia] : no anorexia [Malaise] : no malaise [Weight Loss] : no weight loss [Fever] : no fever [Chills] : no chills [Fatigue] : no fatigue [Malar Facial Rash] : no malar facial rash [Skin Lesions] : no lesions [Skin Nodules] : no skin nodules [Oral Ulcers] : no oral ulcers [Cough] : no cough

## 2024-03-28 NOTE — PROCEDURE
[Today's Date:] : Date: [unfilled] [Soft Tissue Injection] : soft tissue injection was performed [Patient] : the patient [Risks] : risks [Benefits] : benefits [Consent Obtained] : written consent was obtained prior to the procedure and is detailed in the patient's record [Alternatives] : alternatives [Therapeutic] : therapeutic [#1 Site: ______] : #1 site identified in the [unfilled] [Alcohol] : alcohol [Tolerated Well] : the patient tolerated the procedure well [No Complications] : there were no complications [Instructions Given] : handouts/patient instructions were given to patient [Patient Instructed to Call] : patient was instructed to call if redness at site, a decrease in range of motion or an increase in pain is noted after procedure. [de-identified] : Prolia 60mg

## 2024-09-13 NOTE — ED PROVIDER NOTE - MUSCULOSKELETAL NEGATIVE STATEMENT, MLM
Activities of daily living, including home environment that might     exacerbate pain or reduce effectiveness of the pain management plan of care as well as strategies to address these issues
no back pain, no gout, no musculoskeletal pain, no neck pain, and no weakness.

## 2024-10-08 ENCOUNTER — APPOINTMENT (OUTPATIENT)
Dept: RHEUMATOLOGY | Facility: CLINIC | Age: 64
End: 2024-10-08
Payer: MEDICARE

## 2024-10-08 VITALS
HEIGHT: 64 IN | OXYGEN SATURATION: 96 % | HEART RATE: 70 BPM | TEMPERATURE: 97.3 F | DIASTOLIC BLOOD PRESSURE: 76 MMHG | SYSTOLIC BLOOD PRESSURE: 118 MMHG

## 2024-10-08 DIAGNOSIS — S83.242D OTHER TEAR OF MEDIAL MENISCUS, CURRENT INJURY, LEFT KNEE, SUBSEQUENT ENCOUNTER: ICD-10-CM

## 2024-10-08 DIAGNOSIS — M17.12 UNILATERAL PRIMARY OSTEOARTHRITIS, LEFT KNEE: ICD-10-CM

## 2024-10-08 DIAGNOSIS — M25.532 PAIN IN RIGHT WRIST: ICD-10-CM

## 2024-10-08 DIAGNOSIS — M75.81 OTHER SHOULDER LESIONS, RIGHT SHOULDER: ICD-10-CM

## 2024-10-08 DIAGNOSIS — M81.0 AGE-RELATED OSTEOPOROSIS W/OUT CURRENT PATHOLOGICAL FRACTURE: ICD-10-CM

## 2024-10-08 DIAGNOSIS — D64.9 ANEMIA, UNSPECIFIED: ICD-10-CM

## 2024-10-08 DIAGNOSIS — M70.62 TROCHANTERIC BURSITIS, LEFT HIP: ICD-10-CM

## 2024-10-08 DIAGNOSIS — E55.9 VITAMIN D DEFICIENCY, UNSPECIFIED: ICD-10-CM

## 2024-10-08 DIAGNOSIS — M11.231 OTHER CHONDROCALCINOSIS, RIGHT WRIST: ICD-10-CM

## 2024-10-08 DIAGNOSIS — M13.0 POLYARTHRITIS, UNSPECIFIED: ICD-10-CM

## 2024-10-08 DIAGNOSIS — M25.531 PAIN IN RIGHT WRIST: ICD-10-CM

## 2024-10-08 PROCEDURE — 20610 DRAIN/INJ JOINT/BURSA W/O US: CPT | Mod: LT

## 2024-10-08 PROCEDURE — 96372 THER/PROPH/DIAG INJ SC/IM: CPT | Mod: 59

## 2024-10-08 PROCEDURE — 99214 OFFICE O/P EST MOD 30 MIN: CPT | Mod: 25

## 2024-10-08 RX ORDER — METHYLPRED ACET/NACL,ISO-OS/PF 40 MG/ML
40 VIAL (ML) INJECTION
Refills: 0 | Status: COMPLETED | OUTPATIENT
Start: 2024-10-08

## 2024-10-08 RX ORDER — DENOSUMAB 60 MG/ML
60 INJECTION SUBCUTANEOUS
Qty: 0 | Refills: 0 | Status: COMPLETED | OUTPATIENT
Start: 2024-10-08

## 2024-10-08 RX ADMIN — DENOSUMAB 0 MG/ML: 60 INJECTION SUBCUTANEOUS at 00:00

## 2024-10-08 RX ADMIN — METHYLPREDNISOLONE ACETATE MG/ML: 40 INJECTION, SUSPENSION INTRA-ARTICULAR; INTRALESIONAL; INTRAMUSCULAR; SOFT TISSUE at 00:00

## 2024-10-16 ENCOUNTER — OFFICE (OUTPATIENT)
Dept: URBAN - METROPOLITAN AREA CLINIC 115 | Facility: CLINIC | Age: 64
Setting detail: OPHTHALMOLOGY
End: 2024-10-16
Payer: MEDICARE

## 2024-10-16 DIAGNOSIS — G50.0: ICD-10-CM

## 2024-10-16 DIAGNOSIS — H25.13: ICD-10-CM

## 2024-10-16 DIAGNOSIS — H02.403: ICD-10-CM

## 2024-10-16 DIAGNOSIS — G70.00: ICD-10-CM

## 2024-10-16 DIAGNOSIS — H35.033: ICD-10-CM

## 2024-10-16 DIAGNOSIS — H53.2: ICD-10-CM

## 2024-10-16 DIAGNOSIS — H50.05: ICD-10-CM

## 2024-10-16 DIAGNOSIS — H04.123: ICD-10-CM

## 2024-10-16 DIAGNOSIS — H53.433: ICD-10-CM

## 2024-10-16 PROBLEM — H50.10 EXOTROPIA, UNSPECIFIED: Status: ACTIVE | Noted: 2024-10-16

## 2024-10-16 PROBLEM — H50.21 HYPERTROPIA RIGHT EYE: Status: ACTIVE | Noted: 2024-10-16

## 2024-10-16 PROCEDURE — 92083 EXTENDED VISUAL FIELD XM: CPT | Performed by: OPHTHALMOLOGY

## 2024-10-16 PROCEDURE — 92133 CPTRZD OPH DX IMG PST SGM ON: CPT | Performed by: OPHTHALMOLOGY

## 2024-10-16 PROCEDURE — 92014 COMPRE OPH EXAM EST PT 1/>: CPT | Performed by: OPHTHALMOLOGY

## 2024-10-16 ASSESSMENT — REFRACTION_MANIFEST
OU_VA: 20/20
OS_VA1: 20/25
OD_SPHERE: +1.75
OD_CYLINDER: -0.75
OD_CYLINDER: -0.50
OS_CYLINDER: -0.50
OS_AXIS: 140
OD_AXIS: 060
OD_SPHERE: PL
OD_VA1: 20/25
OD_VPRISM: BD
OS_CYLINDER: -0.25
OD_VA1: 20/50
OD_AXIS: 075
OD_AXIS: 070
OS_CYLINDER: -0.75
OS_SPHERE: +1.50
OS_SPHERE: +2.50
OS_AXIS: 120
OS_SPHERE: +0.50
OS_VA1: 20/20
OD_CYLINDER: -0.75
OS_AXIS: 125
OD_SPHERE: -1.00
OD_ADD: +2.50
OS_ADD: +2.50

## 2024-10-16 ASSESSMENT — REFRACTION_CURRENTRX
OS_AXIS: 180
OD_ADD: +2.00
OD_SPHERE: +0.50
OS_ADD: +2.25
OS_CYLINDER: -1.25
OS_OVR_VA: 20/
OS_ADD: +1.50
OD_AXIS: 072
OD_OVR_VA: 20/
OD_CYLINDER: -0.25
OD_CYLINDER: 0.00
OD_SPHERE: +2.50
OD_AXIS: 018
OS_SPHERE: +2.25
OD_OVR_VA: 20/
OD_ADD: +2.50
OD_SPHERE: -0.75
OS_SPHERE: +2.25
OS_VPRISM_DIRECTION: PROGS
OS_ADD: +2.00
OS_SPHERE: +1.25
OS_AXIS: 180
OD_SPHERE: +2.25
OS_CYLINDER: 0.00
OS_OVR_VA: 20/
OD_CYLINDER: -0.25
OS_SPHERE: +2.50
OS_CYLINDER: -0.25
OD_ADD: +2.25
OD_VPRISM_DIRECTION: PROGS
OS_ADD: +2.50
OS_VPRISM_DIRECTION: PROGS
OS_CYLINDER: 0.00
OD_VPRISM_DIRECTION: PROGS
OD_AXIS: 044
OS_VPRISM_DIRECTION: PROGS
OD_VPRISM_DIRECTION: PROGS
OD_CYLINDER: -0.75
OD_OVR_VA: 20/
OD_ADD: +2.25
OD_AXIS: 180
OS_OVR_VA: 20/
OS_AXIS: 120
OS_AXIS: 180

## 2024-10-16 ASSESSMENT — CONFRONTATIONAL VISUAL FIELD TEST (CVF)
OS_FINDINGS: FULL
OD_FINDINGS: FULL

## 2024-10-16 ASSESSMENT — REFRACTION_AUTOREFRACTION
OS_SPHERE: +0.75
OD_CYLINDER: -0.75
OD_AXIS: 070
OD_SPHERE: -1.00
OS_CYLINDER: -0.75
OS_AXIS: 142

## 2024-10-16 ASSESSMENT — VISUAL ACUITY
OD_BCVA: 20/30
OS_BCVA: 20/50-1

## 2024-10-16 ASSESSMENT — TONOMETRY
OD_IOP_MMHG: 15
OS_IOP_MMHG: 14

## 2024-10-16 ASSESSMENT — LID POSITION - PTOSIS
OS_PTOSIS: LUL T
OD_PTOSIS: RUL T

## 2024-11-18 ENCOUNTER — OFFICE (OUTPATIENT)
Dept: URBAN - METROPOLITAN AREA CLINIC 115 | Facility: CLINIC | Age: 64
Setting detail: OPHTHALMOLOGY
End: 2024-11-18
Payer: MEDICARE

## 2024-11-18 DIAGNOSIS — G70.00: ICD-10-CM

## 2024-11-18 DIAGNOSIS — H25.11: ICD-10-CM

## 2024-11-18 DIAGNOSIS — G50.0: ICD-10-CM

## 2024-11-18 DIAGNOSIS — H25.13: ICD-10-CM

## 2024-11-18 DIAGNOSIS — H53.433: ICD-10-CM

## 2024-11-18 PROCEDURE — 92136 OPHTHALMIC BIOMETRY: CPT | Mod: 26,RT | Performed by: OPHTHALMOLOGY

## 2024-11-18 PROCEDURE — 92136 OPHTHALMIC BIOMETRY: CPT | Mod: TC | Performed by: OPHTHALMOLOGY

## 2024-11-18 PROCEDURE — 99214 OFFICE O/P EST MOD 30 MIN: CPT | Performed by: OPHTHALMOLOGY

## 2024-11-18 ASSESSMENT — REFRACTION_CURRENTRX
OS_CYLINDER: 0.00
OD_OVR_VA: 20/
OD_VPRISM_DIRECTION: PROGS
OD_OVR_VA: 20/
OS_OVR_VA: 20/
OD_SPHERE: +2.25
OS_SPHERE: +2.25
OD_AXIS: 180
OS_CYLINDER: -1.25
OD_VPRISM_DIRECTION: PROGS
OD_CYLINDER: -0.75
OS_AXIS: 180
OD_AXIS: 072
OD_ADD: +2.25
OS_CYLINDER: -0.25
OS_VPRISM_DIRECTION: PROGS
OS_ADD: +2.25
OD_SPHERE: +2.50
OS_AXIS: 180
OS_CYLINDER: 0.00
OD_CYLINDER: -0.25
OS_VPRISM_DIRECTION: PROGS
OD_AXIS: 044
OD_VPRISM_DIRECTION: PROGS
OD_CYLINDER: 0.00
OS_AXIS: 120
OD_ADD: +2.50
OS_SPHERE: +2.25
OD_SPHERE: -0.75
OS_OVR_VA: 20/
OD_SPHERE: +0.50
OS_ADD: +2.50
OS_SPHERE: +2.50
OD_OVR_VA: 20/
OS_ADD: +2.00
OD_ADD: +2.25
OS_SPHERE: +1.25
OD_CYLINDER: -0.25
OS_ADD: +1.50
OD_AXIS: 018
OS_AXIS: 180
OS_OVR_VA: 20/
OS_VPRISM_DIRECTION: PROGS
OD_ADD: +2.00

## 2024-11-18 ASSESSMENT — REFRACTION_MANIFEST
OD_CYLINDER: -0.50
OD_VPRISM: BD
OS_AXIS: 125
OS_AXIS: 140
OS_SPHERE: +0.50
OD_SPHERE: PL
OU_VA: 20/20
OS_VA1: 20/25
OS_SPHERE: +2.50
OD_AXIS: 060
OD_AXIS: 075
OD_ADD: +2.50
OS_CYLINDER: -0.75
OD_VA1: 20/50
OS_AXIS: 120
OS_SPHERE: +1.50
OD_CYLINDER: -0.75
OS_VA1: 20/20
OD_CYLINDER: -0.75
OS_ADD: +2.50
OD_AXIS: 070
OD_VA1: 20/25
OS_CYLINDER: -0.50
OD_SPHERE: -1.00
OD_SPHERE: +1.75
OS_CYLINDER: -0.25

## 2024-11-18 ASSESSMENT — VISUAL ACUITY
OD_BCVA: 20/30-
OS_BCVA: 20/50

## 2024-11-18 ASSESSMENT — CONFRONTATIONAL VISUAL FIELD TEST (CVF)
OD_FINDINGS: FULL
OS_FINDINGS: FULL

## 2024-11-18 ASSESSMENT — LID POSITION - PTOSIS
OS_PTOSIS: LUL T
OD_PTOSIS: RUL T

## 2024-11-18 ASSESSMENT — TONOMETRY
OD_IOP_MMHG: 15
OS_IOP_MMHG: 12

## 2024-11-18 ASSESSMENT — REFRACTION_AUTOREFRACTION
OS_AXIS: 133
OS_SPHERE: +0.50
OD_CYLINDER: -0.50
OD_SPHERE: -1.25
OD_AXIS: 062
OS_CYLINDER: -0.75

## 2025-01-23 ENCOUNTER — ASC (OUTPATIENT)
Dept: URBAN - METROPOLITAN AREA SURGERY 8 | Facility: SURGERY | Age: 65
Setting detail: OPHTHALMOLOGY
End: 2025-01-23
Payer: MEDICARE

## 2025-01-23 DIAGNOSIS — H52.221: ICD-10-CM

## 2025-01-23 DIAGNOSIS — H25.11: ICD-10-CM

## 2025-01-23 PROCEDURE — FEMTO PRECISION LASER CATARACT SURGERY: Mod: GY,RT | Performed by: OPHTHALMOLOGY

## 2025-01-23 PROCEDURE — 66984 XCAPSL CTRC RMVL W/O ECP: CPT | Mod: RT | Performed by: OPHTHALMOLOGY

## 2025-01-23 PROCEDURE — S9986 NOT MEDICALLY NECESSARY SVC: HCPCS | Mod: GX,GY | Performed by: OPHTHALMOLOGY

## 2025-01-24 ENCOUNTER — OFFICE (OUTPATIENT)
Dept: URBAN - METROPOLITAN AREA CLINIC 115 | Facility: CLINIC | Age: 65
Setting detail: OPHTHALMOLOGY
End: 2025-01-24
Payer: MEDICARE

## 2025-01-24 DIAGNOSIS — H25.12: ICD-10-CM

## 2025-01-24 DIAGNOSIS — Z96.1: ICD-10-CM

## 2025-01-24 PROCEDURE — 92136 OPHTHALMIC BIOMETRY: CPT | Mod: 26,LT | Performed by: OPHTHALMOLOGY

## 2025-01-24 PROCEDURE — 99024 POSTOP FOLLOW-UP VISIT: CPT | Performed by: OPHTHALMOLOGY

## 2025-01-24 ASSESSMENT — REFRACTION_MANIFEST
OD_SPHERE: PL
OD_ADD: +2.50
OD_SPHERE: +1.75
OS_SPHERE: +2.50
OS_CYLINDER: -0.25
OS_AXIS: 140
OS_SPHERE: +0.50
OS_VA1: 20/20
OD_VA1: 20/50
OS_AXIS: 120
OD_VA1: 20/25
OD_CYLINDER: -0.50
OU_VA: 20/20
OD_CYLINDER: -0.75
OD_AXIS: 075
OS_AXIS: 125
OD_SPHERE: -1.00
OS_VA1: 20/25
OD_VPRISM: BD
OS_CYLINDER: -0.75
OD_AXIS: 070
OD_CYLINDER: -0.75
OS_CYLINDER: -0.50
OD_AXIS: 060
OS_ADD: +2.50
OS_SPHERE: +1.50

## 2025-01-24 ASSESSMENT — REFRACTION_CURRENTRX
OS_CYLINDER: -0.25
OD_CYLINDER: -0.25
OD_ADD: +2.25
OS_AXIS: 180
OD_AXIS: 044
OS_VPRISM_DIRECTION: PROGS
OS_SPHERE: +2.25
OD_ADD: +2.25
OS_OVR_VA: 20/
OD_ADD: +2.00
OD_AXIS: 018
OD_SPHERE: +2.25
OS_ADD: +1.50
OS_SPHERE: +2.25
OD_VPRISM_DIRECTION: PROGS
OD_SPHERE: +2.50
OD_AXIS: 180
OS_VPRISM_DIRECTION: PROGS
OD_SPHERE: -0.75
OS_ADD: +2.25
OS_OVR_VA: 20/
OD_CYLINDER: 0.00
OD_VPRISM_DIRECTION: PROGS
OS_AXIS: 180
OD_OVR_VA: 20/
OD_ADD: +2.50
OD_CYLINDER: -0.75
OD_CYLINDER: -0.25
OD_VPRISM_DIRECTION: PROGS
OD_OVR_VA: 20/
OD_AXIS: 072
OD_OVR_VA: 20/
OS_CYLINDER: 0.00
OS_AXIS: 120
OD_SPHERE: +0.50
OS_OVR_VA: 20/
OS_SPHERE: +2.50
OS_AXIS: 180
OS_CYLINDER: 0.00
OS_SPHERE: +1.25
OS_ADD: +2.00
OS_VPRISM_DIRECTION: PROGS
OS_ADD: +2.50
OS_CYLINDER: -1.25

## 2025-01-24 ASSESSMENT — CONFRONTATIONAL VISUAL FIELD TEST (CVF)
OS_FINDINGS: FULL
OD_FINDINGS: FULL

## 2025-01-24 ASSESSMENT — LID POSITION - PTOSIS
OD_PTOSIS: RUL T
OS_PTOSIS: LUL T

## 2025-01-24 ASSESSMENT — TONOMETRY
OD_IOP_MMHG: 18
OS_IOP_MMHG: 12

## 2025-01-24 ASSESSMENT — REFRACTION_AUTOREFRACTION
OD_AXIS: 100
OD_SPHERE: -1.25
OD_CYLINDER: -1.25
OS_CYLINDER: -0.50
OS_SPHERE: +1.00
OS_AXIS: 133

## 2025-01-24 ASSESSMENT — VISUAL ACUITY
OD_BCVA: 20/25-1
OS_BCVA: 20/40

## 2025-01-24 ASSESSMENT — CORNEAL EDEMA - FOLDS/STRIAE: OD_FOLDSSTRIAE: T

## 2025-01-30 ENCOUNTER — ASC (OUTPATIENT)
Dept: URBAN - METROPOLITAN AREA SURGERY 8 | Facility: SURGERY | Age: 65
Setting detail: OPHTHALMOLOGY
End: 2025-01-30
Payer: MEDICARE

## 2025-01-30 DIAGNOSIS — H25.12: ICD-10-CM

## 2025-01-30 DIAGNOSIS — H52.222: ICD-10-CM

## 2025-01-30 PROCEDURE — S9986 NOT MEDICALLY NECESSARY SVC: HCPCS | Mod: GX,GY | Performed by: OPHTHALMOLOGY

## 2025-01-30 PROCEDURE — FEMTO PRECISION LASER CATARACT SURGERY: Mod: GY | Performed by: OPHTHALMOLOGY

## 2025-01-30 PROCEDURE — 66984 XCAPSL CTRC RMVL W/O ECP: CPT | Mod: 79,LT | Performed by: OPHTHALMOLOGY

## 2025-01-31 PROBLEM — Z96.1 PSEUDOPHAKIA: Status: ACTIVE | Noted: 2025-01-24

## 2025-01-31 PROBLEM — H25.12 CATARACT SENILE NUCLEAR SCLEROSIS;  , LEFT EYE: Status: RESOLVED | Noted: 2025-01-24 | Resolved: 2025-01-31

## 2025-02-12 ENCOUNTER — APPOINTMENT (OUTPATIENT)
Age: 65
End: 2025-02-12
Payer: MEDICARE

## 2025-02-12 VITALS
BODY MASS INDEX: 21.08 KG/M2 | WEIGHT: 125 LBS | DIASTOLIC BLOOD PRESSURE: 79 MMHG | HEART RATE: 82 BPM | HEIGHT: 64.5 IN | SYSTOLIC BLOOD PRESSURE: 123 MMHG

## 2025-02-12 DIAGNOSIS — S42.292A OTHER DISPLACED FRACTURE OF UPPER END OF LEFT HUMERUS, INITIAL ENCOUNTER FOR CLOSED FRACTURE: ICD-10-CM

## 2025-02-12 PROCEDURE — 23600 CLTX PROX HUMRL FX W/O MNPJ: CPT | Mod: LT

## 2025-02-12 PROCEDURE — 99203 OFFICE O/P NEW LOW 30 MIN: CPT | Mod: 57

## 2025-02-12 PROCEDURE — 73060 X-RAY EXAM OF HUMERUS: CPT

## 2025-02-12 RX ORDER — TRAMADOL HYDROCHLORIDE 50 MG/1
50 TABLET, COATED ORAL
Qty: 60 | Refills: 0 | Status: ACTIVE | COMMUNITY
Start: 2025-02-12 | End: 1900-01-01

## 2025-02-12 RX ORDER — METHOCARBAMOL 1000 MG/1
TABLET, FILM COATED ORAL
Refills: 0 | Status: ACTIVE | COMMUNITY

## 2025-02-12 RX ORDER — IBUPROFEN 200 MG/1
CAPSULE, LIQUID FILLED ORAL
Refills: 0 | Status: ACTIVE | COMMUNITY

## 2025-02-12 RX ORDER — IMMUN GLOB G(IGG)/GLY/IGA OV50 10 %
VIAL (ML) INTRAVENOUS
Refills: 0 | Status: ACTIVE | COMMUNITY

## 2025-02-14 ENCOUNTER — OFFICE (OUTPATIENT)
Dept: URBAN - METROPOLITAN AREA CLINIC 115 | Facility: CLINIC | Age: 65
Setting detail: OPHTHALMOLOGY
End: 2025-02-14
Payer: MEDICARE

## 2025-02-14 DIAGNOSIS — Z96.1: ICD-10-CM

## 2025-02-14 PROCEDURE — 99024 POSTOP FOLLOW-UP VISIT: CPT | Performed by: OPHTHALMOLOGY

## 2025-02-14 ASSESSMENT — CONFRONTATIONAL VISUAL FIELD TEST (CVF)
OS_FINDINGS: FULL
OD_FINDINGS: FULL

## 2025-02-14 ASSESSMENT — REFRACTION_MANIFEST
OU_VA: 20/20
OD_SPHERE: PL
OD_VPRISM: BD
OD_CYLINDER: -0.50
OS_AXIS: 120
OS_SPHERE: +0.50
OD_CYLINDER: -0.75
OD_VA1: 20/25
OS_SPHERE: +1.50
OD_SPHERE: +1.75
OD_SPHERE: -1.00
OS_CYLINDER: -0.25
OD_AXIS: 060
OD_CYLINDER: -0.75
OS_VA1: 20/25
OS_VA1: 20/20
OS_SPHERE: +2.50
OD_AXIS: 075
OS_CYLINDER: -0.50
OS_CYLINDER: -0.75
OS_AXIS: 125
OS_AXIS: 140
OS_ADD: +2.50
OD_AXIS: 070
OD_ADD: +2.50
OD_VA1: 20/50

## 2025-02-14 ASSESSMENT — REFRACTION_AUTOREFRACTION
OS_SPHERE: +0.50
OD_AXIS: 094
OD_SPHERE: +0.75
OS_CYLINDER: -0.75
OD_CYLINDER: -1.25
OS_AXIS: 109

## 2025-02-14 ASSESSMENT — REFRACTION_CURRENTRX
OS_CYLINDER: 0.00
OS_OVR_VA: 20/
OD_OVR_VA: 20/
OS_CYLINDER: -1.25
OD_CYLINDER: 0.00
OS_CYLINDER: 0.00
OS_AXIS: 180
OS_SPHERE: +1.25
OD_CYLINDER: -0.25
OD_CYLINDER: -0.75
OD_ADD: +2.25
OS_SPHERE: +2.25
OD_VPRISM_DIRECTION: PROGS
OD_CYLINDER: -0.25
OD_VPRISM_DIRECTION: PROGS
OD_AXIS: 180
OS_VPRISM_DIRECTION: PROGS
OD_VPRISM_DIRECTION: PROGS
OS_VPRISM_DIRECTION: PROGS
OS_AXIS: 120
OS_ADD: +2.50
OS_VPRISM_DIRECTION: PROGS
OS_AXIS: 180
OS_SPHERE: +2.50
OS_AXIS: 180
OD_SPHERE: -0.75
OD_ADD: +2.50
OD_AXIS: 018
OS_OVR_VA: 20/
OS_ADD: +1.50
OS_ADD: +2.25
OD_ADD: +2.25
OD_OVR_VA: 20/
OD_OVR_VA: 20/
OD_SPHERE: +0.50
OD_SPHERE: +2.50
OS_ADD: +2.00
OD_SPHERE: +2.25
OD_AXIS: 044
OS_OVR_VA: 20/
OS_SPHERE: +2.25
OD_ADD: +2.00
OD_AXIS: 072
OS_CYLINDER: -0.25

## 2025-02-14 ASSESSMENT — VISUAL ACUITY
OS_BCVA: 20/25-1
OD_BCVA: 20/25

## 2025-02-14 ASSESSMENT — LID POSITION - PTOSIS
OS_PTOSIS: LUL T
OD_PTOSIS: RUL T

## 2025-02-14 ASSESSMENT — TONOMETRY
OD_IOP_MMHG: 16
OS_IOP_MMHG: 16

## 2025-02-14 ASSESSMENT — CORNEAL EDEMA - FOLDS/STRIAE
OS_FOLDSSTRIAE: ABSENT
OD_FOLDSSTRIAE: ABSENT

## 2025-03-04 ENCOUNTER — OFFICE (OUTPATIENT)
Dept: URBAN - METROPOLITAN AREA CLINIC 115 | Facility: CLINIC | Age: 65
Setting detail: OPHTHALMOLOGY
End: 2025-03-04
Payer: MEDICARE

## 2025-03-04 DIAGNOSIS — Z96.1: ICD-10-CM

## 2025-03-04 PROCEDURE — 99024 POSTOP FOLLOW-UP VISIT: CPT | Performed by: OPHTHALMOLOGY

## 2025-03-04 ASSESSMENT — REFRACTION_MANIFEST
OD_SPHERE: PLANO
OS_SPHERE: +2.50
OD_VA1: 20/20
OS_SPHERE: +1.50
OS_SPHERE: +0.50
OS_SPHERE: +0.50
OS_AXIS: 105
OD_CYLINDER: -0.50
OD_ADD: +2.50
OS_CYLINDER: -0.50
OD_SPHERE: +1.75
OS_CYLINDER: -0.25
OD_CYLINDER: -0.75
OS_ADD: +2.50
OD_AXIS: 075
OS_VA1: 20/20
OS_VA1: 20/20
OS_AXIS: 125
OU_VA: 20/20
OD_SPHERE: PL
OD_VA1: 20/25
OD_AXIS: 070
OD_AXIS: 060
OD_VA1: 20/50
OS_ADD: +2.50
OS_VA1: 20/25
OD_CYLINDER: -0.75
OS_CYLINDER: -0.50
OS_AXIS: 120
OD_CYLINDER: SPH
OS_AXIS: 140
OS_CYLINDER: -0.75
OD_SPHERE: -1.00
OD_VPRISM: BD
OD_ADD: +2.50

## 2025-03-04 ASSESSMENT — REFRACTION_CURRENTRX
OS_CYLINDER: -0.25
OS_ADD: +2.00
OD_AXIS: 072
OS_AXIS: 180
OD_SPHERE: +0.50
OS_CYLINDER: 0.00
OD_ADD: +2.50
OD_CYLINDER: -0.25
OD_AXIS: 180
OS_ADD: +2.25
OD_OVR_VA: 20/
OD_ADD: +2.00
OS_AXIS: 120
OD_CYLINDER: -0.25
OS_ADD: +2.50
OS_OVR_VA: 20/
OD_AXIS: 044
OD_SPHERE: -0.75
OD_ADD: +2.25
OS_CYLINDER: 0.00
OS_AXIS: 180
OS_CYLINDER: -1.25
OD_SPHERE: +2.50
OS_OVR_VA: 20/
OD_OVR_VA: 20/
OD_ADD: +2.25
OS_SPHERE: +2.50
OS_SPHERE: +2.25
OD_VPRISM_DIRECTION: PROGS
OD_CYLINDER: 0.00
OD_CYLINDER: -0.75
OS_OVR_VA: 20/
OD_VPRISM_DIRECTION: PROGS
OS_AXIS: 180
OD_AXIS: 018
OS_VPRISM_DIRECTION: PROGS
OS_ADD: +1.50
OD_VPRISM_DIRECTION: PROGS
OS_SPHERE: +2.25
OD_OVR_VA: 20/
OS_VPRISM_DIRECTION: PROGS
OS_SPHERE: +1.25
OD_SPHERE: +2.25
OS_VPRISM_DIRECTION: PROGS

## 2025-03-04 ASSESSMENT — REFRACTION_AUTOREFRACTION
OD_SPHERE: +0.50
OD_AXIS: 091
OS_SPHERE: +0.50
OS_CYLINDER: -0.75
OS_AXIS: 103
OD_CYLINDER: -0.50

## 2025-03-04 ASSESSMENT — CONFRONTATIONAL VISUAL FIELD TEST (CVF)
OD_FINDINGS: FULL
OS_FINDINGS: FULL

## 2025-03-04 ASSESSMENT — LID POSITION - PTOSIS
OD_PTOSIS: RUL T
OS_PTOSIS: LUL T

## 2025-03-04 ASSESSMENT — CORNEAL EDEMA - FOLDS/STRIAE
OS_FOLDSSTRIAE: ABSENT
OD_FOLDSSTRIAE: ABSENT

## 2025-03-04 ASSESSMENT — TONOMETRY
OS_IOP_MMHG: 12
OD_IOP_MMHG: 16

## 2025-03-04 ASSESSMENT — VISUAL ACUITY
OS_BCVA: 20/25
OD_BCVA: 20/25

## 2025-03-12 ENCOUNTER — APPOINTMENT (OUTPATIENT)
Dept: ORTHOPEDIC SURGERY | Facility: CLINIC | Age: 65
End: 2025-03-12

## 2025-03-12 VITALS
SYSTOLIC BLOOD PRESSURE: 134 MMHG | HEIGHT: 64.5 IN | WEIGHT: 125 LBS | DIASTOLIC BLOOD PRESSURE: 81 MMHG | BODY MASS INDEX: 21.08 KG/M2 | HEART RATE: 80 BPM

## 2025-03-12 DIAGNOSIS — S42.292D OTHER DISPLACED FRACTURE OF UPPER END OF LEFT HUMERUS, SUBSEQUENT ENCOUNTER FOR FRACTURE WITH ROUTINE HEALING: ICD-10-CM

## 2025-03-12 PROCEDURE — 99204 OFFICE O/P NEW MOD 45 MIN: CPT

## 2025-03-12 PROCEDURE — G2211 COMPLEX E/M VISIT ADD ON: CPT | Mod: NC

## 2025-03-12 PROCEDURE — 73030 X-RAY EXAM OF SHOULDER: CPT | Mod: LT

## 2025-04-22 ENCOUNTER — APPOINTMENT (OUTPATIENT)
Dept: RHEUMATOLOGY | Facility: CLINIC | Age: 65
End: 2025-04-22

## 2025-05-02 ENCOUNTER — OFFICE (OUTPATIENT)
Dept: URBAN - METROPOLITAN AREA CLINIC 115 | Facility: CLINIC | Age: 65
Setting detail: OPHTHALMOLOGY
End: 2025-05-02
Payer: MEDICARE

## 2025-05-02 DIAGNOSIS — H04.123: ICD-10-CM

## 2025-05-02 DIAGNOSIS — H53.2: ICD-10-CM

## 2025-05-02 DIAGNOSIS — H50.21: ICD-10-CM

## 2025-05-02 DIAGNOSIS — H50.05: ICD-10-CM

## 2025-05-02 DIAGNOSIS — Z96.1: ICD-10-CM

## 2025-05-02 DIAGNOSIS — H35.033: ICD-10-CM

## 2025-05-02 DIAGNOSIS — H50.10: ICD-10-CM

## 2025-05-02 DIAGNOSIS — H53.433: ICD-10-CM

## 2025-05-02 DIAGNOSIS — G50.0: ICD-10-CM

## 2025-05-02 DIAGNOSIS — G70.00: ICD-10-CM

## 2025-05-02 DIAGNOSIS — H02.403: ICD-10-CM

## 2025-05-02 PROCEDURE — 99213 OFFICE O/P EST LOW 20 MIN: CPT | Performed by: OPHTHALMOLOGY

## 2025-05-02 PROCEDURE — 92133 CPTRZD OPH DX IMG PST SGM ON: CPT | Performed by: OPHTHALMOLOGY

## 2025-05-02 PROCEDURE — 92083 EXTENDED VISUAL FIELD XM: CPT | Performed by: OPHTHALMOLOGY

## 2025-05-02 ASSESSMENT — REFRACTION_CURRENTRX
OS_AXIS: 180
OD_OVR_VA: 20/
OD_SPHERE: +2.25
OS_ADD: +2.00
OS_AXIS: 180
OD_VPRISM_DIRECTION: PROGS
OD_ADD: +2.00
OS_CYLINDER: -0.25
OS_AXIS: 180
OS_CYLINDER: -1.25
OD_CYLINDER: -0.25
OS_OVR_VA: 20/
OS_VPRISM_DIRECTION: PROGS
OS_SPHERE: +1.25
OS_ADD: +2.50
OD_CYLINDER: -0.25
OD_ADD: +2.25
OS_SPHERE: +2.25
OS_AXIS: 120
OD_AXIS: 044
OD_SPHERE: -0.75
OS_ADD: +2.25
OD_ADD: +2.25
OS_VPRISM_DIRECTION: PROGS
OS_OVR_VA: 20/
OD_AXIS: 072
OD_SPHERE: +2.50
OS_CYLINDER: 0.00
OD_OVR_VA: 20/
OD_SPHERE: +0.50
OD_AXIS: 180
OS_CYLINDER: 0.00
OS_VPRISM_DIRECTION: PROGS
OD_OVR_VA: 20/
OS_SPHERE: +2.50
OD_VPRISM_DIRECTION: PROGS
OD_CYLINDER: 0.00
OD_CYLINDER: -0.75
OS_ADD: +1.50
OS_SPHERE: +2.25
OD_ADD: +2.50
OD_VPRISM_DIRECTION: PROGS
OS_OVR_VA: 20/
OD_AXIS: 018

## 2025-05-02 ASSESSMENT — TONOMETRY
OS_IOP_MMHG: 14
OD_IOP_MMHG: 13

## 2025-05-02 ASSESSMENT — REFRACTION_MANIFEST
OD_AXIS: 075
OD_SPHERE: PL
OD_SPHERE: +1.75
OD_SPHERE: PLANO
OD_ADD: +2.50
OD_AXIS: 070
OS_AXIS: 140
OD_VA1: 20/50
OS_ADD: +2.50
OS_VA1: 20/25
OS_AXIS: 105
OD_AXIS: 060
OS_CYLINDER: -0.25
OD_CYLINDER: -0.50
OS_CYLINDER: -0.50
OD_VA1: 20/25
OD_CYLINDER: -0.75
OS_SPHERE: +2.50
OS_VA1: 20/20
OD_CYLINDER: -0.75
OD_VA1: 20/20
OD_SPHERE: -1.00
OS_ADD: +2.50
OS_AXIS: 125
OS_SPHERE: +0.50
OS_AXIS: 120
OS_SPHERE: +0.50
OD_CYLINDER: SPH
OU_VA: 20/20
OS_SPHERE: +1.50
OD_VPRISM: BD
OS_CYLINDER: -0.50
OD_ADD: +2.50
OS_CYLINDER: -0.75
OS_VA1: 20/20

## 2025-05-02 ASSESSMENT — REFRACTION_AUTOREFRACTION
OD_CYLINDER: -1.00
OS_CYLINDER: -0.75
OS_AXIS: 100
OS_SPHERE: +0.50
OD_SPHERE: +0.50
OD_AXIS: 085

## 2025-05-02 ASSESSMENT — CONFRONTATIONAL VISUAL FIELD TEST (CVF)
OS_FINDINGS: FULL
OD_FINDINGS: FULL

## 2025-05-02 ASSESSMENT — LID POSITION - PTOSIS
OD_PTOSIS: RUL T
OS_PTOSIS: LUL T

## 2025-05-02 ASSESSMENT — VISUAL ACUITY
OD_BCVA: 20/25
OS_BCVA: 20/25

## 2025-05-02 ASSESSMENT — CORNEAL EDEMA - FOLDS/STRIAE
OS_FOLDSSTRIAE: ABSENT
OD_FOLDSSTRIAE: ABSENT

## 2025-05-08 ENCOUNTER — APPOINTMENT (OUTPATIENT)
Dept: RHEUMATOLOGY | Facility: CLINIC | Age: 65
End: 2025-05-08
Payer: COMMERCIAL

## 2025-05-08 ENCOUNTER — NON-APPOINTMENT (OUTPATIENT)
Age: 65
End: 2025-05-08

## 2025-05-08 VITALS
SYSTOLIC BLOOD PRESSURE: 128 MMHG | DIASTOLIC BLOOD PRESSURE: 88 MMHG | OXYGEN SATURATION: 98 % | HEART RATE: 77 BPM | TEMPERATURE: 98.1 F | HEIGHT: 64.5 IN

## 2025-05-08 DIAGNOSIS — M75.81 OTHER SHOULDER LESIONS, RIGHT SHOULDER: ICD-10-CM

## 2025-05-08 DIAGNOSIS — M17.12 UNILATERAL PRIMARY OSTEOARTHRITIS, LEFT KNEE: ICD-10-CM

## 2025-05-08 DIAGNOSIS — M25.531 PAIN IN RIGHT WRIST: ICD-10-CM

## 2025-05-08 DIAGNOSIS — D64.9 ANEMIA, UNSPECIFIED: ICD-10-CM

## 2025-05-08 DIAGNOSIS — M11.231 OTHER CHONDROCALCINOSIS, RIGHT WRIST: ICD-10-CM

## 2025-05-08 DIAGNOSIS — M70.62 TROCHANTERIC BURSITIS, LEFT HIP: ICD-10-CM

## 2025-05-08 DIAGNOSIS — M81.0 AGE-RELATED OSTEOPOROSIS W/OUT CURRENT PATHOLOGICAL FRACTURE: ICD-10-CM

## 2025-05-08 DIAGNOSIS — S83.242D OTHER TEAR OF MEDIAL MENISCUS, CURRENT INJURY, LEFT KNEE, SUBSEQUENT ENCOUNTER: ICD-10-CM

## 2025-05-08 DIAGNOSIS — M13.0 POLYARTHRITIS, UNSPECIFIED: ICD-10-CM

## 2025-05-08 DIAGNOSIS — M25.532 PAIN IN RIGHT WRIST: ICD-10-CM

## 2025-05-08 DIAGNOSIS — E55.9 VITAMIN D DEFICIENCY, UNSPECIFIED: ICD-10-CM

## 2025-05-08 LAB
25(OH)D3 SERPL-MCNC: 29 NG/ML
ALBUMIN SERPL ELPH-MCNC: 4.3 G/DL
ALP BLD-CCNC: 127 U/L
ALT SERPL-CCNC: 11 U/L
ANION GAP SERPL CALC-SCNC: 15 MMOL/L
AST SERPL-CCNC: 17 U/L
BASOPHILS # BLD AUTO: 0.06 K/UL
BASOPHILS NFR BLD AUTO: 1 %
BILIRUB SERPL-MCNC: 0.4 MG/DL
BUN SERPL-MCNC: 9 MG/DL
CALCIUM SERPL-MCNC: 9.6 MG/DL
CHLORIDE SERPL-SCNC: 102 MMOL/L
CO2 SERPL-SCNC: 23 MMOL/L
CREAT SERPL-MCNC: 1.1 MG/DL
EGFRCR SERPLBLD CKD-EPI 2021: 55 ML/MIN/1.73M2
EOSINOPHIL # BLD AUTO: 0.14 K/UL
EOSINOPHIL NFR BLD AUTO: 2.4 %
GLUCOSE SERPL-MCNC: 91 MG/DL
HCT VFR BLD CALC: 39.6 %
HGB BLD-MCNC: 13.2 G/DL
IMM GRANULOCYTES NFR BLD AUTO: 0 %
LYMPHOCYTES # BLD AUTO: 2.38 K/UL
LYMPHOCYTES NFR BLD AUTO: 40.8 %
MAN DIFF?: NORMAL
MCHC RBC-ENTMCNC: 30.8 PG
MCHC RBC-ENTMCNC: 33.3 G/DL
MCV RBC AUTO: 92.5 FL
MONOCYTES # BLD AUTO: 0.38 K/UL
MONOCYTES NFR BLD AUTO: 6.5 %
NEUTROPHILS # BLD AUTO: 2.87 K/UL
NEUTROPHILS NFR BLD AUTO: 49.3 %
PLATELET # BLD AUTO: 287 K/UL
POTASSIUM SERPL-SCNC: 4.2 MMOL/L
PROT SERPL-MCNC: 7.1 G/DL
RBC # BLD: 4.28 M/UL
RBC # FLD: 15.6 %
SODIUM SERPL-SCNC: 140 MMOL/L
WBC # FLD AUTO: 5.83 K/UL

## 2025-05-08 PROCEDURE — 36415 COLL VENOUS BLD VENIPUNCTURE: CPT

## 2025-05-08 PROCEDURE — 99204 OFFICE O/P NEW MOD 45 MIN: CPT | Mod: 25

## 2025-05-08 PROCEDURE — 96401 CHEMO ANTI-NEOPL SQ/IM: CPT

## 2025-05-08 RX ORDER — DENOSUMAB 60 MG/ML
60 INJECTION SUBCUTANEOUS
Qty: 0 | Refills: 0 | Status: COMPLETED | OUTPATIENT
Start: 2025-05-08

## 2025-05-08 RX ADMIN — DENOSUMAB 0 MG/ML: 60 INJECTION SUBCUTANEOUS at 00:00

## 2025-05-22 ENCOUNTER — OFFICE (OUTPATIENT)
Dept: URBAN - METROPOLITAN AREA CLINIC 115 | Facility: CLINIC | Age: 65
Setting detail: OPHTHALMOLOGY
End: 2025-05-22
Payer: COMMERCIAL

## 2025-05-22 DIAGNOSIS — H00.15: ICD-10-CM

## 2025-05-22 PROCEDURE — 99212 OFFICE O/P EST SF 10 MIN: CPT | Performed by: OPTOMETRIST

## 2025-05-22 ASSESSMENT — REFRACTION_AUTOREFRACTION
OS_CYLINDER: -0.75
OS_AXIS: 100
OS_SPHERE: DEFER
OD_AXIS: 085
OD_CYLINDER: -1.00
OD_SPHERE: DEFER

## 2025-05-22 ASSESSMENT — REFRACTION_CURRENTRX
OS_SPHERE: +1.25
OS_VPRISM_DIRECTION: PROGS
OS_OVR_VA: 20/
OD_OVR_VA: 20/
OD_AXIS: 180
OD_SPHERE: +2.50
OD_OVR_VA: 20/
OS_ADD: +1.50
OS_CYLINDER: 0.00
OS_OVR_VA: 20/
OS_ADD: +2.25
OS_CYLINDER: 0.00
OD_OVR_VA: 20/
OS_AXIS: 180
OD_VPRISM_DIRECTION: PROGS
OS_SPHERE: +2.25
OS_OVR_VA: 20/
OD_AXIS: 018
OS_SPHERE: +2.25
OS_AXIS: 180
OD_VPRISM_DIRECTION: PROGS
OD_SPHERE: +0.50
OS_AXIS: 180
OD_ADD: +2.00
OD_CYLINDER: -0.25
OD_ADD: +2.25
OS_VPRISM_DIRECTION: PROGS
OD_SPHERE: -0.75
OD_CYLINDER: -0.75
OD_CYLINDER: 0.00
OD_CYLINDER: -0.25
OD_SPHERE: +2.25
OS_CYLINDER: -0.25
OS_ADD: +2.50
OD_ADD: +2.50
OD_AXIS: 044
OS_ADD: +2.00
OS_AXIS: 120
OS_CYLINDER: -1.25
OS_VPRISM_DIRECTION: PROGS
OS_SPHERE: +2.50
OD_ADD: +2.25
OD_AXIS: 072
OD_VPRISM_DIRECTION: PROGS

## 2025-05-22 ASSESSMENT — REFRACTION_MANIFEST
OS_AXIS: 125
OD_SPHERE: PL
OD_CYLINDER: -0.50
OS_VA1: 20/25
OD_ADD: +2.50
OD_VPRISM: BD
OS_SPHERE: +1.50
OS_SPHERE: +0.50
OD_CYLINDER: SPH
OD_AXIS: 060
OS_SPHERE: +0.50
OD_SPHERE: PLANO
OD_VA1: 20/20
OD_CYLINDER: -0.75
OS_VA1: 20/20
OU_VA: 20/20
OS_ADD: +2.50
OD_VA1: 20/50
OS_SPHERE: +2.50
OD_ADD: +2.50
OS_AXIS: 140
OD_SPHERE: +1.75
OD_SPHERE: -1.00
OD_CYLINDER: -0.75
OS_VA1: 20/20
OS_ADD: +2.50
OD_AXIS: 075
OD_AXIS: 070
OS_CYLINDER: -0.75
OS_AXIS: 105
OS_AXIS: 120
OD_VA1: 20/25
OS_CYLINDER: -0.25
OS_CYLINDER: -0.50
OS_CYLINDER: -0.50

## 2025-05-22 ASSESSMENT — CORNEAL EDEMA - FOLDS/STRIAE
OS_FOLDSSTRIAE: ABSENT
OD_FOLDSSTRIAE: ABSENT

## 2025-05-22 ASSESSMENT — LID POSITION - PTOSIS
OS_PTOSIS: LUL T
OD_PTOSIS: RUL T

## 2025-05-22 ASSESSMENT — CONFRONTATIONAL VISUAL FIELD TEST (CVF)
OS_FINDINGS: FULL
OD_FINDINGS: FULL

## 2025-05-22 ASSESSMENT — VISUAL ACUITY
OD_BCVA: 20/25
OS_BCVA: 20/25

## 2025-06-25 ENCOUNTER — OFFICE (OUTPATIENT)
Dept: URBAN - METROPOLITAN AREA CLINIC 115 | Facility: CLINIC | Age: 65
Setting detail: OPHTHALMOLOGY
End: 2025-06-25
Payer: MEDICARE

## 2025-06-25 DIAGNOSIS — H01.004: ICD-10-CM

## 2025-06-25 DIAGNOSIS — H35.033: ICD-10-CM

## 2025-06-25 DIAGNOSIS — H01.001: ICD-10-CM

## 2025-06-25 DIAGNOSIS — H00.11: ICD-10-CM

## 2025-06-25 PROCEDURE — 92012 INTRM OPH EXAM EST PATIENT: CPT | Performed by: OPTOMETRIST

## 2025-06-25 ASSESSMENT — REFRACTION_CURRENTRX
OS_AXIS: 180
OS_AXIS: 180
OS_OVR_VA: 20/
OS_AXIS: 180
OD_SPHERE: -0.75
OD_SPHERE: +0.50
OS_OVR_VA: 20/
OD_AXIS: 018
OD_AXIS: 072
OS_CYLINDER: 0.00
OD_CYLINDER: -0.75
OS_ADD: +2.00
OS_SPHERE: +2.50
OD_ADD: +2.00
OD_VPRISM_DIRECTION: PROGS
OD_CYLINDER: -0.25
OS_ADD: +1.50
OD_AXIS: 180
OD_SPHERE: +2.25
OS_VPRISM_DIRECTION: PROGS
OD_CYLINDER: -0.25
OD_ADD: +2.25
OS_SPHERE: +1.25
OS_SPHERE: +2.25
OS_CYLINDER: -0.25
OS_ADD: +2.25
OS_OVR_VA: 20/
OS_AXIS: 120
OD_AXIS: 044
OS_CYLINDER: -1.25
OD_ADD: +2.25
OS_VPRISM_DIRECTION: PROGS
OS_ADD: +2.50
OS_VPRISM_DIRECTION: PROGS
OD_OVR_VA: 20/
OD_OVR_VA: 20/
OD_VPRISM_DIRECTION: PROGS
OS_CYLINDER: 0.00
OD_ADD: +2.50
OD_VPRISM_DIRECTION: PROGS
OS_SPHERE: +2.25
OD_CYLINDER: 0.00
OD_SPHERE: +2.50
OD_OVR_VA: 20/

## 2025-06-25 ASSESSMENT — REFRACTION_MANIFEST
OS_AXIS: 140
OD_SPHERE: PL
OS_VA1: 20/20
OS_ADD: +2.50
OD_AXIS: 075
OS_SPHERE: +1.50
OS_VA1: 20/25
OS_AXIS: 125
OD_VA1: 20/50
OS_CYLINDER: -0.25
OS_SPHERE: +2.50
OD_SPHERE: -1.00
OS_CYLINDER: -0.50
OD_ADD: +2.50
OD_VPRISM: BD
OD_CYLINDER: SPH
OD_CYLINDER: -0.50
OU_VA: 20/20
OS_SPHERE: +0.50
OS_AXIS: 105
OD_SPHERE: PLANO
OD_AXIS: 070
OD_CYLINDER: -0.75
OD_ADD: +2.50
OD_AXIS: 060
OD_CYLINDER: -0.75
OD_SPHERE: +1.75
OS_VA1: 20/20
OS_CYLINDER: -0.50
OS_AXIS: 120
OD_VA1: 20/25
OS_ADD: +2.50
OS_SPHERE: +0.50
OS_CYLINDER: -0.75
OD_VA1: 20/20

## 2025-06-25 ASSESSMENT — VISUAL ACUITY
OS_BCVA: 20/30-1
OD_BCVA: 20/30

## 2025-06-25 ASSESSMENT — LID EXAM ASSESSMENTS
OS_ANGULAR_BLEPHARITIS: LUL
OD_ANGULAR_BLEPHARITIS: RUL

## 2025-06-25 ASSESSMENT — REFRACTION_AUTOREFRACTION
OD_AXIS: 086
OD_CYLINDER: -1.00
OS_SPHERE: +0.25
OD_SPHERE: +0.50
OS_AXIS: 106
OS_CYLINDER: -0.75

## 2025-06-25 ASSESSMENT — CONFRONTATIONAL VISUAL FIELD TEST (CVF)
OS_FINDINGS: FULL
OD_FINDINGS: FULL

## 2025-06-25 ASSESSMENT — TONOMETRY
OD_IOP_MMHG: 15
OS_IOP_MMHG: 13

## 2025-06-25 ASSESSMENT — LID POSITION - PTOSIS
OD_PTOSIS: RUL T
OS_PTOSIS: LUL T

## 2025-06-25 ASSESSMENT — CORNEAL EDEMA - FOLDS/STRIAE
OS_FOLDSSTRIAE: ABSENT
OD_FOLDSSTRIAE: ABSENT

## 2025-08-27 ENCOUNTER — NON-APPOINTMENT (OUTPATIENT)
Age: 65
End: 2025-08-27

## 2025-09-02 ENCOUNTER — APPOINTMENT (OUTPATIENT)
Dept: RHEUMATOLOGY | Facility: CLINIC | Age: 65
End: 2025-09-02

## 2025-09-02 VITALS
WEIGHT: 125 LBS | DIASTOLIC BLOOD PRESSURE: 74 MMHG | HEIGHT: 64.5 IN | HEART RATE: 64 BPM | SYSTOLIC BLOOD PRESSURE: 120 MMHG | TEMPERATURE: 98.1 F | OXYGEN SATURATION: 99 % | BODY MASS INDEX: 21.08 KG/M2

## 2025-09-02 DIAGNOSIS — M11.231 OTHER CHONDROCALCINOSIS, RIGHT WRIST: ICD-10-CM

## 2025-09-02 DIAGNOSIS — M70.62 TROCHANTERIC BURSITIS, LEFT HIP: ICD-10-CM

## 2025-09-02 DIAGNOSIS — M75.81 OTHER SHOULDER LESIONS, RIGHT SHOULDER: ICD-10-CM

## 2025-09-02 DIAGNOSIS — S83.242D OTHER TEAR OF MEDIAL MENISCUS, CURRENT INJURY, LEFT KNEE, SUBSEQUENT ENCOUNTER: ICD-10-CM

## 2025-09-02 DIAGNOSIS — M81.0 AGE-RELATED OSTEOPOROSIS W/OUT CURRENT PATHOLOGICAL FRACTURE: ICD-10-CM

## 2025-09-02 DIAGNOSIS — D64.9 ANEMIA, UNSPECIFIED: ICD-10-CM

## 2025-09-02 DIAGNOSIS — M25.532 PAIN IN RIGHT WRIST: ICD-10-CM

## 2025-09-02 DIAGNOSIS — E55.9 VITAMIN D DEFICIENCY, UNSPECIFIED: ICD-10-CM

## 2025-09-02 DIAGNOSIS — M13.0 POLYARTHRITIS, UNSPECIFIED: ICD-10-CM

## 2025-09-02 DIAGNOSIS — R76.8 OTHER SPECIFIED ABNORMAL IMMUNOLOGICAL FINDINGS IN SERUM: ICD-10-CM

## 2025-09-02 DIAGNOSIS — M25.531 PAIN IN RIGHT WRIST: ICD-10-CM

## 2025-09-02 DIAGNOSIS — M17.12 UNILATERAL PRIMARY OSTEOARTHRITIS, LEFT KNEE: ICD-10-CM

## 2025-09-02 LAB
C3 SERPL-MCNC: 118 MG/DL
C4 SERPL-MCNC: 26 MG/DL

## 2025-09-02 PROCEDURE — 99214 OFFICE O/P EST MOD 30 MIN: CPT

## 2025-09-02 PROCEDURE — G2211 COMPLEX E/M VISIT ADD ON: CPT | Mod: NC

## 2025-09-02 PROCEDURE — 36415 COLL VENOUS BLD VENIPUNCTURE: CPT

## 2025-09-03 LAB
CRP SERPL-MCNC: 7 MG/L
DSDNA AB SER-ACNC: 1 IU/ML
ENA RNP AB SER-ACNC: 0.3 AL
ENA SM AB SER-ACNC: <0.2 AL
ENA SS-A AB SER-ACNC: 0.4 AL
ENA SS-B AB SER-ACNC: <0.2 AL
ERYTHROCYTE [SEDIMENTATION RATE] IN BLOOD BY WESTERGREN METHOD: 23 MM/HR
THYROGLOB AB SERPL-ACNC: 21.7 IU/ML
THYROPEROXIDASE AB SERPL IA-ACNC: 32.4 IU/ML

## 2025-09-05 LAB
ANA TITR SER: ABNORMAL
ANA TITR SER: ABNORMAL